# Patient Record
Sex: MALE | Race: WHITE | NOT HISPANIC OR LATINO | Employment: FULL TIME | URBAN - METROPOLITAN AREA
[De-identification: names, ages, dates, MRNs, and addresses within clinical notes are randomized per-mention and may not be internally consistent; named-entity substitution may affect disease eponyms.]

---

## 2020-09-19 ENCOUNTER — OFFICE VISIT (OUTPATIENT)
Dept: URGENT CARE | Facility: CLINIC | Age: 60
End: 2020-09-19
Payer: COMMERCIAL

## 2020-09-19 VITALS
WEIGHT: 210 LBS | HEIGHT: 71 IN | HEART RATE: 76 BPM | OXYGEN SATURATION: 100 % | BODY MASS INDEX: 29.4 KG/M2 | TEMPERATURE: 97.9 F | RESPIRATION RATE: 16 BRPM

## 2020-09-19 DIAGNOSIS — R09.81 NASAL CONGESTION: ICD-10-CM

## 2020-09-19 DIAGNOSIS — J06.9 VIRAL URI WITH COUGH: ICD-10-CM

## 2020-09-19 DIAGNOSIS — R05.9 COUGH: Primary | ICD-10-CM

## 2020-09-19 PROCEDURE — 99213 OFFICE O/P EST LOW 20 MIN: CPT | Performed by: NURSE PRACTITIONER

## 2020-09-19 PROCEDURE — U0003 INFECTIOUS AGENT DETECTION BY NUCLEIC ACID (DNA OR RNA); SEVERE ACUTE RESPIRATORY SYNDROME CORONAVIRUS 2 (SARS-COV-2) (CORONAVIRUS DISEASE [COVID-19]), AMPLIFIED PROBE TECHNIQUE, MAKING USE OF HIGH THROUGHPUT TECHNOLOGIES AS DESCRIBED BY CMS-2020-01-R: HCPCS | Performed by: NURSE PRACTITIONER

## 2020-09-19 NOTE — PROGRESS NOTES
NAME: Nereyda Chase is a 64 y o  male  : 1960    MRN: [id number]    Pulse 76   Temp 97 9 °F (36 6 °C)   Resp 16   Ht 5' 11" (1 803 m)   Wt 95 3 kg (210 lb)   SpO2 100%   BMI 29 29 kg/m²     Assessment and Plan   Cough [R05]  1  Cough  Novel Coronavirus (COVID-19), PCR [facility] - Office Collection   2  Viral URI with cough     3  Nasal congestion         Martina Allen was seen today for covid-19  Diagnoses and all orders for this visit:    Cough  -     Novel Coronavirus (COVID-19), PCR [facility] - Office Collection    Viral URI with cough    Nasal congestion        Patient Instructions   Patient Instructions   Take zyrtec, allegra, or Claritin daily  Use flonase 1-2 sprays in each nare daily   Use nasal saline to the nose,   Use humidifer in room  Symptoms worsen go to ER  Rest      Follow up with pcp   Down load the [employer] chart hema to see results  Covid testing done today and this can take about one week or more to result  If symptoms worsen go to the ER        Proceed to ER if symptoms worsen  Chief Complaint     Chief Complaint   Patient presents with    COVID-19     cough, nasal congestion and h/a         History of Present Illness     63 yo male here today with slight cough nasal congestion and headache  Patient does have history of seasonal allergies not taking anything at this time and has no fever  Patient is just concerned due to his daughter getting  in the next week and wants reassured that his symptoms are more seasonal than of COVID-19  Review of Systems   Review of Systems   Constitutional: Negative  HENT: Positive for congestion and sneezing  Negative for sinus pressure, sinus pain and sore throat  Eyes: Negative  Respiratory: Positive for cough  Cardiovascular: Negative  Gastrointestinal: Negative  Genitourinary: Negative  Musculoskeletal: Negative  Skin: Negative  Current Medications     No current outpatient medications on file      Current Allergies     Allergies as of 09/19/2020    (No Known Allergies)              Past Medical History:   Diagnosis Date    Known health problems: none        Past Surgical History:   Procedure Laterality Date    KNEE CARTILAGE SURGERY      SHOULDER ARTHROSCOPY         Family History   Problem Relation Age of Onset    COPD Father          Medications have been verified  The following portions of the patient's history were reviewed and updated as appropriate: allergies, current medications, past family history, past medical history, past social history, past surgical history and problem list     Objective   Pulse 76   Temp 97 9 °F (36 6 °C)   Resp 16   Ht 5' 11" (1 803 m)   Wt 95 3 kg (210 lb)   SpO2 100%   BMI 29 29 kg/m²      Physical Exam     Physical Exam  Constitutional:       Appearance: Normal appearance  HENT:      Head: Normocephalic  Right Ear: Hearing normal       Left Ear: Hearing normal       Nose:      Right Turbinates: Enlarged and swollen  Left Turbinates: Enlarged and swollen  Right Sinus: No maxillary sinus tenderness or frontal sinus tenderness  Left Sinus: No maxillary sinus tenderness or frontal sinus tenderness  Mouth/Throat:      Pharynx: Oropharynx is clear  Tonsils: No tonsillar exudate or tonsillar abscesses  0 on the right  0 on the left  Cardiovascular:      Rate and Rhythm: Normal rate and regular rhythm  Heart sounds: Normal heart sounds  Pulmonary:      Effort: Pulmonary effort is normal       Breath sounds: Normal breath sounds and air entry  Neurological:      Mental Status: He is alert and oriented to person, place, and time           EVANGELISTA Olivera

## 2020-09-19 NOTE — LETTER
September 19, 2020     Patient: Blake Shields   YOB: 1960   Date of Visit: 9/19/2020       To Whom It May Concern: It is my medical opinion that Blake Shields should remain out of work until labs are negative          Sincerely,        EVANGELISTA Montemayor    CC: No Recipients

## 2020-09-19 NOTE — PATIENT INSTRUCTIONS
Take zyrtec, allegra, or Claritin daily  Use flonase 1-2 sprays in each nare daily   Use nasal saline to the nose,   Use humidifer in room  Symptoms worsen go to ER  Rest      Follow up with pcp   Down load the my chart hema to see results  Covid testing done today and this can take about one week or more to result  If symptoms worsen go to the ER

## 2020-09-21 LAB — SARS-COV-2 RNA SPEC QL NAA+PROBE: NOT DETECTED

## 2021-01-04 ENCOUNTER — APPOINTMENT (EMERGENCY)
Dept: ULTRASOUND IMAGING | Facility: HOSPITAL | Age: 61
End: 2021-01-04
Payer: COMMERCIAL

## 2021-01-04 ENCOUNTER — APPOINTMENT (EMERGENCY)
Dept: CT IMAGING | Facility: HOSPITAL | Age: 61
End: 2021-01-04
Payer: COMMERCIAL

## 2021-01-04 ENCOUNTER — HOSPITAL ENCOUNTER (EMERGENCY)
Facility: HOSPITAL | Age: 61
Discharge: HOME | End: 2021-01-04
Attending: EMERGENCY MEDICINE | Admitting: EMERGENCY MEDICINE
Payer: COMMERCIAL

## 2021-01-04 VITALS
SYSTOLIC BLOOD PRESSURE: 120 MMHG | OXYGEN SATURATION: 96 % | TEMPERATURE: 96.6 F | HEART RATE: 69 BPM | BODY MASS INDEX: 32.01 KG/M2 | RESPIRATION RATE: 19 BRPM | WEIGHT: 228.62 LBS | DIASTOLIC BLOOD PRESSURE: 74 MMHG | HEIGHT: 71 IN

## 2021-01-04 DIAGNOSIS — R10.9 ABDOMINAL PAIN, UNSPECIFIED ABDOMINAL LOCATION: Primary | ICD-10-CM

## 2021-01-04 DIAGNOSIS — K80.20 CALCULUS OF GALLBLADDER WITHOUT CHOLECYSTITIS WITHOUT OBSTRUCTION: ICD-10-CM

## 2021-01-04 LAB
ALBUMIN SERPL BCP-MCNC: 4.3 G/DL (ref 3.5–5)
ALP SERPL-CCNC: 97 U/L (ref 46–116)
ALT SERPL W P-5'-P-CCNC: 61 U/L (ref 12–78)
ANION GAP SERPL CALCULATED.3IONS-SCNC: 11 MMOL/L (ref 4–13)
APTT PPP: 34 SECONDS (ref 23–37)
AST SERPL W P-5'-P-CCNC: 24 U/L (ref 5–45)
BASOPHILS # BLD AUTO: 0.08 THOUSANDS/ΜL (ref 0–0.1)
BASOPHILS NFR BLD AUTO: 1 % (ref 0–1)
BILIRUB SERPL-MCNC: 1.55 MG/DL (ref 0.2–1)
BUN SERPL-MCNC: 12 MG/DL (ref 5–25)
CALCIUM SERPL-MCNC: 9.7 MG/DL (ref 8.3–10.1)
CHLORIDE SERPL-SCNC: 97 MMOL/L (ref 100–108)
CO2 SERPL-SCNC: 29 MMOL/L (ref 21–32)
CREAT SERPL-MCNC: 1.12 MG/DL (ref 0.6–1.3)
EOSINOPHIL # BLD AUTO: 0.01 THOUSAND/ΜL (ref 0–0.61)
EOSINOPHIL NFR BLD AUTO: 0 % (ref 0–6)
ERYTHROCYTE [DISTWIDTH] IN BLOOD BY AUTOMATED COUNT: 11.9 % (ref 11.6–15.1)
GFR SERPL CREATININE-BSD FRML MDRD: 72 ML/MIN/1.73SQ M
GLUCOSE SERPL-MCNC: 173 MG/DL (ref 65–140)
HCT VFR BLD AUTO: 50.2 % (ref 36.5–49.3)
HGB BLD-MCNC: 17.3 G/DL (ref 12–17)
IMM GRANULOCYTES # BLD AUTO: 0.08 THOUSAND/UL (ref 0–0.2)
IMM GRANULOCYTES NFR BLD AUTO: 1 % (ref 0–2)
INR PPP: 0.91 (ref 0.84–1.19)
LACTATE SERPL-SCNC: 2.8 MMOL/L (ref 0.5–2)
LACTATE SERPL-SCNC: 2.9 MMOL/L (ref 0.5–2)
LIPASE SERPL-CCNC: 81 U/L (ref 73–393)
LYMPHOCYTES # BLD AUTO: 1.17 THOUSANDS/ΜL (ref 0.6–4.47)
LYMPHOCYTES NFR BLD AUTO: 8 % (ref 14–44)
MCH RBC QN AUTO: 29.2 PG (ref 26.8–34.3)
MCHC RBC AUTO-ENTMCNC: 34.5 G/DL (ref 31.4–37.4)
MCV RBC AUTO: 85 FL (ref 82–98)
MONOCYTES # BLD AUTO: 0.8 THOUSAND/ΜL (ref 0.17–1.22)
MONOCYTES NFR BLD AUTO: 5 % (ref 4–12)
NEUTROPHILS # BLD AUTO: 13.43 THOUSANDS/ΜL (ref 1.85–7.62)
NEUTS SEG NFR BLD AUTO: 85 % (ref 43–75)
NRBC BLD AUTO-RTO: 0 /100 WBCS
PLATELET # BLD AUTO: 329 THOUSANDS/UL (ref 149–390)
PMV BLD AUTO: 9.7 FL (ref 8.9–12.7)
POTASSIUM SERPL-SCNC: 3.8 MMOL/L (ref 3.5–5.3)
PROT SERPL-MCNC: 8 G/DL (ref 6.4–8.2)
PROTHROMBIN TIME: 12.1 SECONDS (ref 11.6–14.5)
RBC # BLD AUTO: 5.92 MILLION/UL (ref 3.88–5.62)
SODIUM SERPL-SCNC: 137 MMOL/L (ref 136–145)
WBC # BLD AUTO: 15.57 THOUSAND/UL (ref 4.31–10.16)

## 2021-01-04 PROCEDURE — 74177 CT ABD & PELVIS W/CONTRAST: CPT

## 2021-01-04 PROCEDURE — 76705 ECHO EXAM OF ABDOMEN: CPT

## 2021-01-04 PROCEDURE — 85025 COMPLETE CBC W/AUTO DIFF WBC: CPT | Performed by: EMERGENCY MEDICINE

## 2021-01-04 PROCEDURE — 85610 PROTHROMBIN TIME: CPT | Performed by: EMERGENCY MEDICINE

## 2021-01-04 PROCEDURE — 85730 THROMBOPLASTIN TIME PARTIAL: CPT | Performed by: EMERGENCY MEDICINE

## 2021-01-04 PROCEDURE — 96374 THER/PROPH/DIAG INJ IV PUSH: CPT

## 2021-01-04 PROCEDURE — 36415 COLL VENOUS BLD VENIPUNCTURE: CPT | Performed by: EMERGENCY MEDICINE

## 2021-01-04 PROCEDURE — 83605 ASSAY OF LACTIC ACID: CPT | Performed by: EMERGENCY MEDICINE

## 2021-01-04 PROCEDURE — 96361 HYDRATE IV INFUSION ADD-ON: CPT

## 2021-01-04 PROCEDURE — 80053 COMPREHEN METABOLIC PANEL: CPT | Performed by: EMERGENCY MEDICINE

## 2021-01-04 PROCEDURE — 99284 EMERGENCY DEPT VISIT MOD MDM: CPT

## 2021-01-04 PROCEDURE — 99285 EMERGENCY DEPT VISIT HI MDM: CPT | Performed by: EMERGENCY MEDICINE

## 2021-01-04 PROCEDURE — 96375 TX/PRO/DX INJ NEW DRUG ADDON: CPT

## 2021-01-04 PROCEDURE — 83690 ASSAY OF LIPASE: CPT | Performed by: EMERGENCY MEDICINE

## 2021-01-04 RX ORDER — ONDANSETRON 2 MG/ML
4 INJECTION INTRAMUSCULAR; INTRAVENOUS ONCE
Status: COMPLETED | OUTPATIENT
Start: 2021-01-04 | End: 2021-01-04

## 2021-01-04 RX ORDER — POLYETHYLENE GLYCOL 3350 17 G/17G
17 POWDER, FOR SOLUTION ORAL DAILY
Qty: 527 G | Refills: 0 | Status: SHIPPED | OUTPATIENT
Start: 2021-01-04 | End: 2021-02-04

## 2021-01-04 RX ORDER — KETOROLAC TROMETHAMINE 30 MG/ML
30 INJECTION, SOLUTION INTRAMUSCULAR; INTRAVENOUS ONCE
Status: COMPLETED | OUTPATIENT
Start: 2021-01-04 | End: 2021-01-04

## 2021-01-04 RX ORDER — MORPHINE SULFATE 4 MG/ML
4 INJECTION, SOLUTION INTRAMUSCULAR; INTRAVENOUS ONCE
Status: COMPLETED | OUTPATIENT
Start: 2021-01-04 | End: 2021-01-04

## 2021-01-04 RX ADMIN — SODIUM CHLORIDE 1000 ML: 0.9 INJECTION, SOLUTION INTRAVENOUS at 06:37

## 2021-01-04 RX ADMIN — KETOROLAC TROMETHAMINE 30 MG: 30 INJECTION, SOLUTION INTRAMUSCULAR at 06:36

## 2021-01-04 RX ADMIN — ONDANSETRON 4 MG: 2 INJECTION INTRAMUSCULAR; INTRAVENOUS at 06:43

## 2021-01-04 RX ADMIN — IOHEXOL 100 ML: 350 INJECTION, SOLUTION INTRAVENOUS at 07:35

## 2021-01-04 RX ADMIN — MORPHINE SULFATE 4 MG: 4 INJECTION INTRAVENOUS at 06:36

## 2021-01-04 NOTE — ED CARE HANDOFF
Emergency Department Sign Out Note        Sign out and transfer of care from Dr Heather Andersen  See Separate Emergency Department note  The patient, Josué Alvarez, was evaluated by the previous provider for abdominal pain  Workup Completed:  Some labs    ED Course / Workup Pending (followup): Remainder of labs  CT AP    CT scan negative for kidney stones, diverticulitis, colitis  However does reveal cholelithiasis  Patient has elevated white count and elevated total bilirubin  Afebrile  On re-evaluation, he does have some right upper quadrant tenderness  Case discussed with Surgical Service, do recommend right upper quadrant ultrasound  This is currently pending        Results for orders placed or performed during the hospital encounter of 01/04/21   CBC and differential   Result Value Ref Range    WBC 15 57 (H) 4 31 - 10 16 Thousand/uL    RBC 5 92 (H) 3 88 - 5 62 Million/uL    Hemoglobin 17 3 (H) 12 0 - 17 0 g/dL    Hematocrit 50 2 (H) 36 5 - 49 3 %    MCV 85 82 - 98 fL    MCH 29 2 26 8 - 34 3 pg    MCHC 34 5 31 4 - 37 4 g/dL    RDW 11 9 11 6 - 15 1 %    MPV 9 7 8 9 - 12 7 fL    Platelets 479 510 - 876 Thousands/uL    nRBC 0 /100 WBCs    Neutrophils Relative 85 (H) 43 - 75 %    Immat GRANS % 1 0 - 2 %    Lymphocytes Relative 8 (L) 14 - 44 %    Monocytes Relative 5 4 - 12 %    Eosinophils Relative 0 0 - 6 %    Basophils Relative 1 0 - 1 %    Neutrophils Absolute 13 43 (H) 1 85 - 7 62 Thousands/µL    Immature Grans Absolute 0 08 0 00 - 0 20 Thousand/uL    Lymphocytes Absolute 1 17 0 60 - 4 47 Thousands/µL    Monocytes Absolute 0 80 0 17 - 1 22 Thousand/µL    Eosinophils Absolute 0 01 0 00 - 0 61 Thousand/µL    Basophils Absolute 0 08 0 00 - 0 10 Thousands/µL   Protime-INR   Result Value Ref Range    Protime 12 1 11 6 - 14 5 seconds    INR 0 91 0 84 - 1 19   APTT   Result Value Ref Range    PTT 34 23 - 37 seconds   Comprehensive metabolic panel   Result Value Ref Range    Sodium 137 136 - 145 mmol/L    Potassium 3 8 3 5 - 5 3 mmol/L    Chloride 97 (L) 100 - 108 mmol/L    CO2 29 21 - 32 mmol/L    ANION GAP 11 4 - 13 mmol/L    BUN 12 5 - 25 mg/dL    Creatinine 1 12 0 60 - 1 30 mg/dL    Glucose 173 (H) 65 - 140 mg/dL    Calcium 9 7 8 3 - 10 1 mg/dL    AST 24 5 - 45 U/L    ALT 61 12 - 78 U/L    Alkaline Phosphatase 97 46 - 116 U/L    Total Protein 8 0 6 4 - 8 2 g/dL    Albumin 4 3 3 5 - 5 0 g/dL    Total Bilirubin 1 55 (H) 0 20 - 1 00 mg/dL    eGFR 72 ml/min/1 73sq m   Lactic acid   Result Value Ref Range    LACTIC ACID 2 9 (HH) 0 5 - 2 0 mmol/L   Lipase   Result Value Ref Range    Lipase 81 73 - 393 u/L   Lactic acid 2 Hours   Result Value Ref Range    LACTIC ACID 2 8 (HH) 0 5 - 2 0 mmol/L       US gallbladder   Final Result      Gallstone in the gallbladder neck and sludge, however no sonographic evidence of acute cholecystitis  Normal caliber common bile duct  Hepatomegaly with fatty infiltration  Workstation performed: [id number]         CT abdomen pelvis with contrast   Final Result      No acute intra-abdominal abnormality  Cholelithiasis  Workstation performed: [id number]                                             ED Course as of Jan 04 0944   Mon Jan 04, 2021   8898 Discussed with surgeon, Dr Abran Laureano, agrees pt stable for d/c and outpt f/u    Pt agreeable with this plan          Procedures  MDM    Disposition  Final diagnoses:   Abdominal pain, unspecified abdominal location   Calculus of gallbladder without cholecystitis without obstruction     Time reflects when diagnosis was documented in both MDM as applicable and the Disposition within this note     Time User Action Codes Description Comment    1/4/2021  9:41 AM Anya Austin Add [R10 9] Abdominal pain, unspecified abdominal location     1/4/2021  9:41 AM Pedro Austin Add [K80 20] Calculus of gallbladder without cholecystitis without obstruction       ED Disposition     ED Disposition Condition Date/Time Comment    Discharge Stable Mon Jan 4, 2021  9:41 AM Nandini Pelayo discharge to home/self care  Follow-up Information     Follow up With Specialties Details Why Contact Info    Lei Roth, DO General Surgery  As needed [address] [place] [address]          Patient's Medications   Discharge Prescriptions    POLYETHYLENE GLYCOL (GLYCOLAX) 17 GM/SCOOP POWDER    Take 17 g by mouth daily       Start Date: 1/4/2021  End Date: 2/4/2021       Order Dose: 17 g       Quantity: 527 g    Refills: 0     No discharge procedures on file         ED Provider  Electronically Signed by     Fatemeh White MD  01/04/21 0389

## 2021-01-04 NOTE — ED NOTES
Patient had one episode of clear yellow emesis after administration of pain medications        Petra Dyer, [facility]  01/04/21 2730

## 2021-01-04 NOTE — ED PROVIDER NOTES
History  Chief Complaint   Patient presents with    Abdominal Pain     Patient started with abdominal pain last night on the left side but now has pain all over  Patient denies NVD at this time  Patient is a 82-year-old male no prior abdominal surgery history of colonoscopy with polypectomy otherwise healthy no past medical history presents the emergency department complaining of left-sided abdominal pain started last night now radiating throughout the generalized abdomen no diarrhea the patient is not sure if you might be constipated  patient initially denied any vomiting but following medication in the ED he developed nausea and vomiting  No fevers or chills  History provided by:  Patient  Abdominal Pain  Pain location:  LLQ and LUQ  Pain quality: aching and cramping    Pain radiation: Generalized abdomen  Pain severity:  Moderate  Onset quality:  Gradual  Duration:  1 day  Timing:  Constant  Progression:  Worsening  Chronicity:  New  Associated symptoms: constipation, nausea and vomiting    Associated symptoms: no chest pain, no chills, no cough, no diarrhea, no dysuria, no fatigue, no fever, no hematuria, no shortness of breath and no sore throat        None       Past Medical History:   Diagnosis Date    Known health problems: none        Past Surgical History:   Procedure Laterality Date    KNEE CARTILAGE SURGERY      KNEE SURGERY      SHOULDER ARTHROSCOPY      SHOULDER SURGERY         Family History   Problem Relation Age of Onset    COPD Father      I have reviewed and agree with the history as documented  E-Cigarette/Vaping    E-Cigarette Use Never User      E-Cigarette/Vaping Substances     Social History     Tobacco Use    Smoking status: Never Smoker    Smokeless tobacco: Never Used   Substance Use Topics    Alcohol use: Yes    Drug use: Not Currently       Review of Systems   Constitutional: Negative for activity change, appetite change, chills, fatigue and fever     HENT: Negative for congestion, ear pain, rhinorrhea and sore throat  Eyes: Negative for discharge, redness and visual disturbance  Respiratory: Negative for cough, chest tightness, shortness of breath and wheezing  Cardiovascular: Negative for chest pain and palpitations  Gastrointestinal: Positive for abdominal pain, constipation, nausea and vomiting  Negative for diarrhea  Endocrine: Negative for polydipsia and polyuria  Genitourinary: Negative for difficulty urinating, dysuria, frequency, hematuria and urgency  Musculoskeletal: Negative for arthralgias and myalgias  Skin: Negative for color change, pallor and rash  Neurological: Negative for dizziness, weakness, light-headedness, numbness and headaches  Hematological: Negative for adenopathy  Does not bruise/bleed easily  All other systems reviewed and are negative  Physical Exam  Physical Exam  Vitals signs and nursing note reviewed  Constitutional:       Appearance: He is well-developed  HENT:      Head: Normocephalic and atraumatic  Right Ear: External ear normal       Left Ear: External ear normal       Nose: Nose normal    Eyes:      Conjunctiva/sclera: Conjunctivae normal       Pupils: Pupils are equal, round, and reactive to light  Neck:      Musculoskeletal: Normal range of motion and neck supple  Cardiovascular:      Rate and Rhythm: Normal rate and regular rhythm  Heart sounds: Normal heart sounds  Pulmonary:      Effort: Pulmonary effort is normal  No respiratory distress  Breath sounds: Normal breath sounds  No wheezing or rales  Chest:      Chest wall: No tenderness  Abdominal:      General: Bowel sounds are normal  There is no distension  Palpations: Abdomen is soft  Tenderness: There is generalized abdominal tenderness and tenderness in the left upper quadrant and left lower quadrant  There is guarding  Musculoskeletal: Normal range of motion  Skin:     General: Skin is warm and dry  Neurological:      Mental Status: He is alert and oriented to person, place, and time  Cranial Nerves: No cranial nerve deficit  Sensory: No sensory deficit  Vital Signs  ED Triage Vitals [01/04/21 0624]   Temperature Pulse Respirations Blood Pressure SpO2   (!) 96 6 °F (35 9 °C) 67 18 137/83 99 %      Temp Source Heart Rate Source Patient Position - Orthostatic VS BP Location FiO2 (%)   Temporal Monitor Lying Right arm --      Pain Score       8           Vitals:    01/04/21 0624   BP: 137/83   Pulse: 67   Patient Position - Orthostatic VS: Lying         Visual Acuity      ED Medications  Medications   sodium chloride 0 9 % bolus 1,000 mL (1,000 mL Intravenous New Bag 1/4/21 0637)   ketorolac (TORADOL) injection 30 mg (30 mg Intravenous Given 1/4/21 0636)   morphine (PF) 4 mg/mL injection 4 mg (4 mg Intravenous Given 1/4/21 0636)   ondansetron (ZOFRAN) injection 4 mg (4 mg Intravenous Given 1/4/21 0643)       Diagnostic Studies  Results Reviewed     Procedure Component Value Units Date/Time    CBC and differential [[id number]]  (Abnormal) Collected: 01/04/21 0635    Lab Status: Final result Specimen: Blood from Arm, Left Updated: 01/04/21 0645     WBC 15 57 Thousand/uL      RBC 5 92 Million/uL      Hemoglobin 17 3 g/dL      Hematocrit 50 2 %      MCV 85 fL      MCH 29 2 pg      MCHC 34 5 g/dL      RDW 11 9 %      MPV 9 7 fL      Platelets 055 Thousands/uL      nRBC 0 /100 WBCs      Neutrophils Relative 85 %      Immat GRANS % 1 %      Lymphocytes Relative 8 %      Monocytes Relative 5 %      Eosinophils Relative 0 %      Basophils Relative 1 %      Neutrophils Absolute 13 43 Thousands/µL      Immature Grans Absolute 0 08 Thousand/uL      Lymphocytes Absolute 1 17 Thousands/µL      Monocytes Absolute 0 80 Thousand/µL      Eosinophils Absolute 0 01 Thousand/µL      Basophils Absolute 0 08 Thousands/µL     Comprehensive metabolic panel [[id number]] Collected: 01/04/21 0635    Lab Status:  In process Specimen: Blood from Arm, Left Updated: 01/04/21 0643    Lipase [[id number]] Collected: 01/04/21 4741    Lab Status: In process Specimen: Blood from Arm, Left Updated: 01/04/21 [facility] [[id number]] Collected: 01/04/21 0635    Lab Status: In process Specimen: Blood from Arm, Left Updated: 01/04/21 0643    APTT [[id number]] Collected: 01/04/21 0635    Lab Status: In process Specimen: Blood from Arm, Left Updated: 01/04/21 0643    Lactic acid [[id number]] Collected: 01/04/21 0635    Lab Status: In process Specimen: Blood from Arm, Left Updated: 01/04/21 0643    UA w Reflex to Microscopic w Reflex to Culture [[id number]]     Lab Status: No result Specimen: Urine                  CT abdomen pelvis with contrast    (Results Pending)              Procedures  ECG 12 Lead Documentation Only    Date/Time: 1/4/2021 6:50 AM  Performed by: Kin Simon DO  Authorized by: Kin Simon DO     ECG reviewed by me, the ED Provider: yes    Patient location:  ED  Quality:     Tracing quality:  Limited by artifact  Rate:     ECG rate:  64    ECG rate assessment: normal    Rhythm:     Rhythm: sinus rhythm    QRS:     QRS axis:  Normal    QRS intervals:  Normal  Conduction:     Conduction: normal    ST segments:     ST segments:  Normal  T waves:     T waves: non-specific, flattening and inverted               ED Course  ED Course as of Jan 04 0651   Mon Jan 04, 2021   0650 Case discussed and care transferred to Dr Joon Guillen pending labs and imaging and further evaluation and treatment and disposition  SBIRT 20yo+      Most Recent Value   SBIRT (22 yo +)   In order to provide better care to our patients, we are screening all of our patients for alcohol and drug use  Would it be okay to ask you these screening questions?   No Filed at: 01/04/2021 7150                    MDM    Disposition  Final diagnoses:   None     ED Disposition     None      Follow-up Information    None         Patient's Medications    No medications on file     No discharge procedures on file      PDMP Review     None          ED Provider  Electronically Signed by                  Chikis Sherman DO  01/04/21 2304

## 2024-04-22 ENCOUNTER — HOSPITAL ENCOUNTER (INPATIENT)
Facility: HOSPITAL | Age: 64
LOS: 1 days | Discharge: HOME | DRG: 409 | End: 2024-04-24
Attending: EMERGENCY MEDICINE | Admitting: SURGERY
Payer: COMMERCIAL

## 2024-04-22 ENCOUNTER — APPOINTMENT (EMERGENCY)
Dept: CT IMAGING | Facility: HOSPITAL | Age: 64
DRG: 409 | End: 2024-04-22
Payer: COMMERCIAL

## 2024-04-22 DIAGNOSIS — K81.0 ACUTE CHOLECYSTITIS: ICD-10-CM

## 2024-04-22 DIAGNOSIS — K81.9 CHOLECYSTITIS: Primary | ICD-10-CM

## 2024-04-22 DIAGNOSIS — K42.9 UMBILICAL HERNIA WITHOUT OBSTRUCTION AND WITHOUT GANGRENE: ICD-10-CM

## 2024-04-22 LAB
ALBUMIN SERPL BCP-MCNC: 4.8 G/DL (ref 3.5–5)
ALP SERPL-CCNC: 80 U/L (ref 34–104)
ALT SERPL W P-5'-P-CCNC: 22 U/L (ref 7–52)
ANION GAP SERPL CALCULATED.3IONS-SCNC: 10 MMOL/L (ref 4–13)
AST SERPL W P-5'-P-CCNC: 15 U/L (ref 13–39)
BASOPHILS # BLD AUTO: 0.09 THOUSANDS/ÂΜL (ref 0–0.1)
BASOPHILS NFR BLD AUTO: 1 % (ref 0–1)
BILIRUB DIRECT SERPL-MCNC: 0.36 MG/DL (ref 0–0.2)
BILIRUB SERPL-MCNC: 2.54 MG/DL (ref 0.2–1)
BUN SERPL-MCNC: 13 MG/DL (ref 5–25)
CALCIUM SERPL-MCNC: 10.2 MG/DL (ref 8.4–10.2)
CHLORIDE SERPL-SCNC: 97 MMOL/L (ref 96–108)
CO2 SERPL-SCNC: 28 MMOL/L (ref 21–32)
CREAT SERPL-MCNC: 0.95 MG/DL (ref 0.6–1.3)
EOSINOPHIL # BLD AUTO: 0.07 THOUSAND/ÂΜL (ref 0–0.61)
EOSINOPHIL NFR BLD AUTO: 0 % (ref 0–6)
ERYTHROCYTE [DISTWIDTH] IN BLOOD BY AUTOMATED COUNT: 12.2 % (ref 11.6–15.1)
GFR SERPL CREATININE-BSD FRML MDRD: 86 ML/MIN/1.73SQ M
GLUCOSE SERPL-MCNC: 148 MG/DL (ref 65–140)
HCT VFR BLD AUTO: 47.2 % (ref 36.5–49.3)
HGB BLD-MCNC: 16.5 G/DL (ref 12–17)
IMM GRANULOCYTES # BLD AUTO: 0.1 THOUSAND/UL (ref 0–0.2)
IMM GRANULOCYTES NFR BLD AUTO: 1 % (ref 0–2)
LIPASE SERPL-CCNC: 12 U/L (ref 11–82)
LYMPHOCYTES # BLD AUTO: 1.49 THOUSANDS/ÂΜL (ref 0.6–4.47)
LYMPHOCYTES NFR BLD AUTO: 9 % (ref 14–44)
MCH RBC QN AUTO: 29.8 PG (ref 26.8–34.3)
MCHC RBC AUTO-ENTMCNC: 35 G/DL (ref 31.4–37.4)
MCV RBC AUTO: 85 FL (ref 82–98)
MONOCYTES # BLD AUTO: 1.82 THOUSAND/ÂΜL (ref 0.17–1.22)
MONOCYTES NFR BLD AUTO: 10 % (ref 4–12)
NEUTROPHILS # BLD AUTO: 13.87 THOUSANDS/ÂΜL (ref 1.85–7.62)
NEUTS SEG NFR BLD AUTO: 79 % (ref 43–75)
NRBC BLD AUTO-RTO: 0 /100 WBCS
PLATELET # BLD AUTO: 292 THOUSANDS/UL (ref 149–390)
PMV BLD AUTO: 9.8 FL (ref 8.9–12.7)
POTASSIUM SERPL-SCNC: 3.9 MMOL/L (ref 3.5–5.3)
PROT SERPL-MCNC: 7.6 G/DL (ref 6.4–8.4)
RBC # BLD AUTO: 5.54 MILLION/UL (ref 3.88–5.62)
SODIUM SERPL-SCNC: 135 MMOL/L (ref 135–147)
WBC # BLD AUTO: 17.44 THOUSAND/UL (ref 4.31–10.16)

## 2024-04-22 PROCEDURE — 99285 EMERGENCY DEPT VISIT HI MDM: CPT | Performed by: EMERGENCY MEDICINE

## 2024-04-22 PROCEDURE — 74177 CT ABD & PELVIS W/CONTRAST: CPT

## 2024-04-22 PROCEDURE — 99284 EMERGENCY DEPT VISIT MOD MDM: CPT

## 2024-04-22 PROCEDURE — 80053 COMPREHEN METABOLIC PANEL: CPT | Performed by: EMERGENCY MEDICINE

## 2024-04-22 PROCEDURE — 85025 COMPLETE CBC W/AUTO DIFF WBC: CPT | Performed by: EMERGENCY MEDICINE

## 2024-04-22 PROCEDURE — 82248 BILIRUBIN DIRECT: CPT | Performed by: EMERGENCY MEDICINE

## 2024-04-22 PROCEDURE — 96361 HYDRATE IV INFUSION ADD-ON: CPT

## 2024-04-22 PROCEDURE — 36415 COLL VENOUS BLD VENIPUNCTURE: CPT | Performed by: EMERGENCY MEDICINE

## 2024-04-22 PROCEDURE — 96374 THER/PROPH/DIAG INJ IV PUSH: CPT

## 2024-04-22 PROCEDURE — 83690 ASSAY OF LIPASE: CPT | Performed by: EMERGENCY MEDICINE

## 2024-04-22 RX ORDER — KETOROLAC TROMETHAMINE 30 MG/ML
30 INJECTION, SOLUTION INTRAMUSCULAR; INTRAVENOUS ONCE
Status: COMPLETED | OUTPATIENT
Start: 2024-04-22 | End: 2024-04-22

## 2024-04-22 RX ADMIN — KETOROLAC TROMETHAMINE 30 MG: 30 INJECTION, SOLUTION INTRAMUSCULAR; INTRAVENOUS at 22:41

## 2024-04-22 RX ADMIN — SODIUM CHLORIDE 1000 ML: 0.9 INJECTION, SOLUTION INTRAVENOUS at 22:41

## 2024-04-22 RX ADMIN — IOHEXOL 100 ML: 350 INJECTION, SOLUTION INTRAVENOUS at 23:33

## 2024-04-23 ENCOUNTER — ANESTHESIA EVENT (INPATIENT)
Dept: PERIOP | Facility: HOSPITAL | Age: 64
DRG: 409 | End: 2024-04-23
Payer: COMMERCIAL

## 2024-04-23 ENCOUNTER — ANESTHESIA (INPATIENT)
Dept: PERIOP | Facility: HOSPITAL | Age: 64
DRG: 409 | End: 2024-04-23
Payer: COMMERCIAL

## 2024-04-23 PROBLEM — K81.0 ACUTE CHOLECYSTITIS: Status: ACTIVE | Noted: 2024-04-23

## 2024-04-23 LAB
BASOPHILS # BLD AUTO: 0.06 THOUSANDS/ÂΜL (ref 0–0.1)
BASOPHILS NFR BLD AUTO: 0 % (ref 0–1)
EOSINOPHIL # BLD AUTO: 0.03 THOUSAND/ÂΜL (ref 0–0.61)
EOSINOPHIL NFR BLD AUTO: 0 % (ref 0–6)
ERYTHROCYTE [DISTWIDTH] IN BLOOD BY AUTOMATED COUNT: 12.1 % (ref 11.6–15.1)
HCT VFR BLD AUTO: 42.7 % (ref 36.5–49.3)
HGB BLD-MCNC: 14.8 G/DL (ref 12–17)
IMM GRANULOCYTES # BLD AUTO: 0.08 THOUSAND/UL (ref 0–0.2)
IMM GRANULOCYTES NFR BLD AUTO: 1 % (ref 0–2)
LYMPHOCYTES # BLD AUTO: 1.06 THOUSANDS/ÂΜL (ref 0.6–4.47)
LYMPHOCYTES NFR BLD AUTO: 7 % (ref 14–44)
MCH RBC QN AUTO: 29.7 PG (ref 26.8–34.3)
MCHC RBC AUTO-ENTMCNC: 34.7 G/DL (ref 31.4–37.4)
MCV RBC AUTO: 86 FL (ref 82–98)
MONOCYTES # BLD AUTO: 1.99 THOUSAND/ÂΜL (ref 0.17–1.22)
MONOCYTES NFR BLD AUTO: 13 % (ref 4–12)
NEUTROPHILS # BLD AUTO: 12.03 THOUSANDS/ÂΜL (ref 1.85–7.62)
NEUTS SEG NFR BLD AUTO: 79 % (ref 43–75)
NRBC BLD AUTO-RTO: 0 /100 WBCS
PLATELET # BLD AUTO: 236 THOUSANDS/UL (ref 149–390)
PLATELET # BLD AUTO: 246 THOUSANDS/UL (ref 149–390)
PMV BLD AUTO: 9.5 FL (ref 8.9–12.7)
PMV BLD AUTO: 9.6 FL (ref 8.9–12.7)
RBC # BLD AUTO: 4.99 MILLION/UL (ref 3.88–5.62)
WBC # BLD AUTO: 15.25 THOUSAND/UL (ref 4.31–10.16)

## 2024-04-23 PROCEDURE — 85049 AUTOMATED PLATELET COUNT: CPT | Performed by: SURGERY

## 2024-04-23 PROCEDURE — 0W9G4ZZ DRAINAGE OF PERITONEAL CAVITY, PERCUTANEOUS ENDOSCOPIC APPROACH: ICD-10-PCS | Performed by: STUDENT IN AN ORGANIZED HEALTH CARE EDUCATION/TRAINING PROGRAM

## 2024-04-23 PROCEDURE — 0FC44ZZ EXTIRPATION OF MATTER FROM GALLBLADDER, PERCUTANEOUS ENDOSCOPIC APPROACH: ICD-10-PCS | Performed by: STUDENT IN AN ORGANIZED HEALTH CARE EDUCATION/TRAINING PROGRAM

## 2024-04-23 PROCEDURE — 88304 TISSUE EXAM BY PATHOLOGIST: CPT | Performed by: STUDENT IN AN ORGANIZED HEALTH CARE EDUCATION/TRAINING PROGRAM

## 2024-04-23 PROCEDURE — 85025 COMPLETE CBC W/AUTO DIFF WBC: CPT | Performed by: SURGERY

## 2024-04-23 PROCEDURE — 0FT44ZZ RESECTION OF GALLBLADDER, PERCUTANEOUS ENDOSCOPIC APPROACH: ICD-10-PCS | Performed by: STUDENT IN AN ORGANIZED HEALTH CARE EDUCATION/TRAINING PROGRAM

## 2024-04-23 PROCEDURE — 0WQF4ZZ REPAIR ABDOMINAL WALL, PERCUTANEOUS ENDOSCOPIC APPROACH: ICD-10-PCS | Performed by: STUDENT IN AN ORGANIZED HEALTH CARE EDUCATION/TRAINING PROGRAM

## 2024-04-23 PROCEDURE — 88302 TISSUE EXAM BY PATHOLOGIST: CPT | Performed by: STUDENT IN AN ORGANIZED HEALTH CARE EDUCATION/TRAINING PROGRAM

## 2024-04-23 RX ORDER — HYDROMORPHONE HCL/PF 1 MG/ML
0.5 SYRINGE (ML) INJECTION
Status: DISCONTINUED | OUTPATIENT
Start: 2024-04-23 | End: 2024-04-24 | Stop reason: HOSPADM

## 2024-04-23 RX ORDER — ONDANSETRON 2 MG/ML
4 INJECTION INTRAMUSCULAR; INTRAVENOUS ONCE AS NEEDED
Status: DISCONTINUED | OUTPATIENT
Start: 2024-04-23 | End: 2024-04-24

## 2024-04-23 RX ORDER — CEFAZOLIN SODIUM 1 G/3ML
INJECTION, POWDER, FOR SOLUTION INTRAMUSCULAR; INTRAVENOUS AS NEEDED
Status: DISCONTINUED | OUTPATIENT
Start: 2024-04-23 | End: 2024-04-23

## 2024-04-23 RX ORDER — HYDROMORPHONE HCL/PF 1 MG/ML
SYRINGE (ML) INJECTION AS NEEDED
Status: DISCONTINUED | OUTPATIENT
Start: 2024-04-23 | End: 2024-04-23

## 2024-04-23 RX ORDER — BUPIVACAINE HYDROCHLORIDE AND EPINEPHRINE 2.5; 5 MG/ML; UG/ML
INJECTION, SOLUTION EPIDURAL; INFILTRATION; INTRACAUDAL; PERINEURAL AS NEEDED
Status: DISCONTINUED | OUTPATIENT
Start: 2024-04-23 | End: 2024-04-23 | Stop reason: HOSPADM

## 2024-04-23 RX ORDER — ONDANSETRON 2 MG/ML
4 INJECTION INTRAMUSCULAR; INTRAVENOUS EVERY 6 HOURS PRN
Status: DISCONTINUED | OUTPATIENT
Start: 2024-04-23 | End: 2024-04-24 | Stop reason: HOSPADM

## 2024-04-23 RX ORDER — LIDOCAINE HYDROCHLORIDE 10 MG/ML
INJECTION, SOLUTION EPIDURAL; INFILTRATION; INTRACAUDAL; PERINEURAL AS NEEDED
Status: DISCONTINUED | OUTPATIENT
Start: 2024-04-23 | End: 2024-04-23

## 2024-04-23 RX ORDER — ONDANSETRON 2 MG/ML
INJECTION INTRAMUSCULAR; INTRAVENOUS AS NEEDED
Status: DISCONTINUED | OUTPATIENT
Start: 2024-04-23 | End: 2024-04-23

## 2024-04-23 RX ORDER — MIDAZOLAM HYDROCHLORIDE 2 MG/2ML
INJECTION, SOLUTION INTRAMUSCULAR; INTRAVENOUS AS NEEDED
Status: DISCONTINUED | OUTPATIENT
Start: 2024-04-23 | End: 2024-04-23

## 2024-04-23 RX ORDER — ACETAMINOPHEN 10 MG/ML
1000 INJECTION, SOLUTION INTRAVENOUS EVERY 6 HOURS PRN
Status: DISCONTINUED | OUTPATIENT
Start: 2024-04-23 | End: 2024-04-24

## 2024-04-23 RX ORDER — SODIUM CHLORIDE, SODIUM LACTATE, POTASSIUM CHLORIDE, CALCIUM CHLORIDE 600; 310; 30; 20 MG/100ML; MG/100ML; MG/100ML; MG/100ML
INJECTION, SOLUTION INTRAVENOUS CONTINUOUS PRN
Status: DISCONTINUED | OUTPATIENT
Start: 2024-04-23 | End: 2024-04-23

## 2024-04-23 RX ORDER — METRONIDAZOLE 500 MG/100ML
500 INJECTION, SOLUTION INTRAVENOUS EVERY 8 HOURS
Status: DISCONTINUED | OUTPATIENT
Start: 2024-04-23 | End: 2024-04-24 | Stop reason: HOSPADM

## 2024-04-23 RX ORDER — SODIUM CHLORIDE 9 MG/ML
75 INJECTION, SOLUTION INTRAVENOUS CONTINUOUS
Status: DISCONTINUED | OUTPATIENT
Start: 2024-04-23 | End: 2024-04-24 | Stop reason: HOSPADM

## 2024-04-23 RX ORDER — KETOROLAC TROMETHAMINE 30 MG/ML
INJECTION, SOLUTION INTRAMUSCULAR; INTRAVENOUS AS NEEDED
Status: DISCONTINUED | OUTPATIENT
Start: 2024-04-23 | End: 2024-04-23

## 2024-04-23 RX ORDER — ACETAMINOPHEN 325 MG/1
975 TABLET ORAL ONCE
Status: CANCELLED | OUTPATIENT
Start: 2024-04-23 | End: 2024-04-23

## 2024-04-23 RX ORDER — FENTANYL CITRATE 50 UG/ML
INJECTION, SOLUTION INTRAMUSCULAR; INTRAVENOUS AS NEEDED
Status: DISCONTINUED | OUTPATIENT
Start: 2024-04-23 | End: 2024-04-23

## 2024-04-23 RX ORDER — DEXAMETHASONE SODIUM PHOSPHATE 10 MG/ML
INJECTION, SOLUTION INTRAMUSCULAR; INTRAVENOUS AS NEEDED
Status: DISCONTINUED | OUTPATIENT
Start: 2024-04-23 | End: 2024-04-23

## 2024-04-23 RX ORDER — FENTANYL CITRATE/PF 50 MCG/ML
25 SYRINGE (ML) INJECTION
Status: DISCONTINUED | OUTPATIENT
Start: 2024-04-23 | End: 2024-04-24 | Stop reason: HOSPADM

## 2024-04-23 RX ORDER — PROMETHAZINE HYDROCHLORIDE 25 MG/ML
25 INJECTION, SOLUTION INTRAMUSCULAR; INTRAVENOUS ONCE AS NEEDED
Status: DISCONTINUED | OUTPATIENT
Start: 2024-04-23 | End: 2024-04-24 | Stop reason: HOSPADM

## 2024-04-23 RX ORDER — HEPARIN SODIUM 5000 [USP'U]/ML
5000 INJECTION, SOLUTION INTRAVENOUS; SUBCUTANEOUS EVERY 8 HOURS SCHEDULED
Status: DISCONTINUED | OUTPATIENT
Start: 2024-04-23 | End: 2024-04-24 | Stop reason: HOSPADM

## 2024-04-23 RX ORDER — CIPROFLOXACIN 2 MG/ML
400 INJECTION, SOLUTION INTRAVENOUS EVERY 12 HOURS
Status: DISCONTINUED | OUTPATIENT
Start: 2024-04-23 | End: 2024-04-24 | Stop reason: HOSPADM

## 2024-04-23 RX ORDER — PROPOFOL 10 MG/ML
INJECTION, EMULSION INTRAVENOUS AS NEEDED
Status: DISCONTINUED | OUTPATIENT
Start: 2024-04-23 | End: 2024-04-23

## 2024-04-23 RX ORDER — ROCURONIUM BROMIDE 10 MG/ML
INJECTION, SOLUTION INTRAVENOUS AS NEEDED
Status: DISCONTINUED | OUTPATIENT
Start: 2024-04-23 | End: 2024-04-23

## 2024-04-23 RX ADMIN — ROCURONIUM BROMIDE 10 MG: 10 INJECTION, SOLUTION INTRAVENOUS at 19:10

## 2024-04-23 RX ADMIN — HYDROMORPHONE HYDROCHLORIDE 0.5 MG: 1 INJECTION, SOLUTION INTRAMUSCULAR; INTRAVENOUS; SUBCUTANEOUS at 03:38

## 2024-04-23 RX ADMIN — ROCURONIUM BROMIDE 10 MG: 10 INJECTION, SOLUTION INTRAVENOUS at 19:56

## 2024-04-23 RX ADMIN — HYDROMORPHONE HYDROCHLORIDE 0.5 MG: 1 INJECTION, SOLUTION INTRAMUSCULAR; INTRAVENOUS; SUBCUTANEOUS at 18:59

## 2024-04-23 RX ADMIN — ONDANSETRON 4 MG: 2 INJECTION INTRAMUSCULAR; INTRAVENOUS at 18:17

## 2024-04-23 RX ADMIN — SODIUM CHLORIDE 75 ML/HR: 0.9 INJECTION, SOLUTION INTRAVENOUS at 12:57

## 2024-04-23 RX ADMIN — METRONIDAZOLE 500 MG: 500 INJECTION, SOLUTION INTRAVENOUS at 16:22

## 2024-04-23 RX ADMIN — DEXMEDETOMIDINE HYDROCHLORIDE 8 MCG: 100 INJECTION, SOLUTION INTRAVENOUS at 19:56

## 2024-04-23 RX ADMIN — PROPOFOL 200 MG: 10 INJECTION, EMULSION INTRAVENOUS at 18:04

## 2024-04-23 RX ADMIN — DEXMEDETOMIDINE HYDROCHLORIDE 12 MCG: 100 INJECTION, SOLUTION INTRAVENOUS at 18:37

## 2024-04-23 RX ADMIN — LIDOCAINE HYDROCHLORIDE 50 MG: 10 INJECTION, SOLUTION EPIDURAL; INFILTRATION; INTRACAUDAL; PERINEURAL at 18:04

## 2024-04-23 RX ADMIN — MIDAZOLAM 2 MG: 1 INJECTION INTRAMUSCULAR; INTRAVENOUS at 17:55

## 2024-04-23 RX ADMIN — CIPROFLOXACIN 400 MG: 400 INJECTION, SOLUTION INTRAVENOUS at 01:20

## 2024-04-23 RX ADMIN — FENTANYL CITRATE 100 MCG: 50 INJECTION INTRAMUSCULAR; INTRAVENOUS at 18:04

## 2024-04-23 RX ADMIN — CIPROFLOXACIN 400 MG: 400 INJECTION, SOLUTION INTRAVENOUS at 12:57

## 2024-04-23 RX ADMIN — SODIUM CHLORIDE 75 ML/HR: 0.9 INJECTION, SOLUTION INTRAVENOUS at 00:39

## 2024-04-23 RX ADMIN — METRONIDAZOLE 500 MG: 500 INJECTION, SOLUTION INTRAVENOUS at 07:34

## 2024-04-23 RX ADMIN — METRONIDAZOLE 500 MG: 500 INJECTION, SOLUTION INTRAVENOUS at 00:41

## 2024-04-23 RX ADMIN — SUGAMMADEX 200 MG: 100 INJECTION, SOLUTION INTRAVENOUS at 20:18

## 2024-04-23 RX ADMIN — HEPARIN SODIUM 5000 UNITS: 5000 INJECTION, SOLUTION INTRAVENOUS; SUBCUTANEOUS at 14:13

## 2024-04-23 RX ADMIN — HYDROMORPHONE HYDROCHLORIDE 0.5 MG: 1 INJECTION, SOLUTION INTRAMUSCULAR; INTRAVENOUS; SUBCUTANEOUS at 07:44

## 2024-04-23 RX ADMIN — CEFAZOLIN 2000 MG: 330 INJECTION, POWDER, FOR SOLUTION INTRAMUSCULAR; INTRAVENOUS at 18:11

## 2024-04-23 RX ADMIN — ROCURONIUM BROMIDE 10 MG: 10 INJECTION, SOLUTION INTRAVENOUS at 18:50

## 2024-04-23 RX ADMIN — SODIUM CHLORIDE, SODIUM LACTATE, POTASSIUM CHLORIDE, AND CALCIUM CHLORIDE: .6; .31; .03; .02 INJECTION, SOLUTION INTRAVENOUS at 20:20

## 2024-04-23 RX ADMIN — DEXAMETHASONE SODIUM PHOSPHATE 10 MG: 10 INJECTION, SOLUTION INTRAMUSCULAR; INTRAVENOUS at 18:10

## 2024-04-23 RX ADMIN — ROCURONIUM BROMIDE 50 MG: 10 INJECTION, SOLUTION INTRAVENOUS at 18:04

## 2024-04-23 RX ADMIN — HEPARIN SODIUM 5000 UNITS: 5000 INJECTION, SOLUTION INTRAVENOUS; SUBCUTANEOUS at 05:49

## 2024-04-23 RX ADMIN — ONDANSETRON 4 MG: 2 INJECTION INTRAMUSCULAR; INTRAVENOUS at 03:43

## 2024-04-23 RX ADMIN — KETOROLAC TROMETHAMINE 30 MG: 30 INJECTION, SOLUTION INTRAMUSCULAR; INTRAVENOUS at 19:56

## 2024-04-23 NOTE — PLAN OF CARE
Problem: PAIN - ADULT  Goal: Verbalizes/displays adequate comfort level or baseline comfort level  Description: Interventions:  - Encourage patient to monitor pain and request assistance  - Assess pain using appropriate pain scale  - Administer analgesics based on type and severity of pain and evaluate response  - Implement non-pharmacological measures as appropriate and evaluate response  - Consider cultural and social influences on pain and pain management  - Notify physician/advanced practitioner if interventions unsuccessful or patient reports new pain  Outcome: Progressing     Problem: INFECTION - ADULT  Goal: Absence or prevention of progression during hospitalization  Description: INTERVENTIONS:  - Assess and monitor for signs and symptoms of infection  - Monitor lab/diagnostic results  - Monitor all insertion sites, i.e. indwelling lines, tubes, and drains  - Monitor endotracheal if appropriate and nasal secretions for changes in amount and color  - [place] appropriate cooling/warming therapies per order  - Administer medications as ordered  - Instruct and encourage patient and family to use good hand hygiene technique  - Identify and instruct in appropriate isolation precautions for identified infection/condition  Outcome: Progressing  Goal: Absence of fever/infection during neutropenic period  Description: INTERVENTIONS:  - Monitor WBC    Outcome: Progressing     Problem: SAFETY ADULT  Goal: Patient will remain free of falls  Description: INTERVENTIONS:  - Educate patient/family on patient safety including physical limitations  - Instruct patient to call for assistance with activity   - Consult OT/PT to assist with strengthening/mobility   - Keep Call bell within reach  - Keep bed low and locked with side rails adjusted as appropriate  - Keep care items and personal belongings within reach  - Initiate and maintain comfort rounds  - Make Fall Risk Sign visible to staff  - Offer Toileting every 2 Hours,  in advance of need  - Initiate/Maintain alarm  - Obtain necessary fall risk management equipment:   - Apply yellow socks and bracelet for high fall risk patients  - Consider moving patient to room near nurses station  Outcome: Progressing  Goal: Maintain or return to baseline ADL function  Description: INTERVENTIONS:  -  Assess patient's ability to carry out ADLs; assess patient's baseline for ADL function and identify physical deficits which impact ability to perform ADLs (bathing, care of mouth/teeth, toileting, grooming, dressing, etc.)  - Assess/evaluate cause of self-care deficits   - Assess range of motion  - Assess patient's mobility; develop plan if impaired  - Assess patient's need for assistive devices and provide as appropriate  - Encourage maximum independence but intervene and supervise when necessary  - Involve family in performance of ADLs  - Assess for home care needs following discharge   - Consider OT consult to assist with ADL evaluation and planning for discharge  - Provide patient education as appropriate  Outcome: Progressing  Goal: Maintains/Returns to pre admission functional level  Description: INTERVENTIONS:  - Perform AM-PAC 6 Click Basic Mobility/ Daily Activity assessment daily.  - Set and communicate daily mobility goal to care team and patient/family/caregiver.   - Collaborate with rehabilitation services on mobility goals if consulted  - Perform Range of Motion 3 times a day.  - Reposition patient every 2 hours.  - Dangle patient 3 times a day  - Stand patient 3 times a day  - Ambulate patient 3 times a day  - Out of bed to chair 3 times a day   - Out of bed for meals 3 times a day  - Out of bed for toileting  - Record patient progress and toleration of activity level   Outcome: Progressing     Problem: DISCHARGE PLANNING  Goal: Discharge to home or other facility with appropriate resources  Description: INTERVENTIONS:  - Identify barriers to discharge w/patient and caregiver  -  Arrange for needed discharge resources and transportation as appropriate  - Identify discharge learning needs (meds, wound care, etc.)  - Arrange for interpretive services to assist at discharge as needed  - Refer to Case Management Department for coordinating discharge planning if the patient needs post-hospital services based on physician/advanced practitioner order or complex needs related to functional status, cognitive ability, or social support system  Outcome: Progressing     Problem: Knowledge Deficit  Goal: Patient/family/caregiver demonstrates understanding of disease process, treatment plan, medications, and discharge instructions  Description: Complete learning assessment and assess knowledge base.  Interventions:  - Provide teaching at level of understanding  - Provide teaching via preferred learning methods  Outcome: Progressing

## 2024-04-23 NOTE — DISCHARGE INSTR - AVS FIRST PAGE
Laparoscopic Cholecystectomy   WHAT YOU NEED TO KNOW:   Laparoscopic cholecystectomy is surgery to remove your gallbladder.     DISCHARGE INSTRUCTIONS:   Call Dr. Green's office at [phone] with any questions or concerns    Medicines:  You may need any of the following:  Prescription pain medicine - Take as directed    You may also take tylenol or ibuprofen as needed    Take your medicine as directed.  Contact your healthcare provider if you think your medicine is not helping or if you have side effects. Tell her if you are allergic to any medicine. Keep a list of the medicines, vitamins, and herbs you take. Include the amounts, and when and why you take them. Bring the list or the pill bottles to follow-up visits. Carry your medicine list with you in case of an emergency.    Follow up with your healthcare provider 2 weeks after surgery, or as directed:  Write down your questions so you remember to ask them during your visits.  Wound care:  You may shower and pat the incisions dry.  Do not soak underwater for 2 weeks   What to eat after surgery:  You may eat whatever you feel up to following surgery.   You may notice diarrhea with fatty foods. Drink plenty of liquids.   When to return to work and other activities:  No lifting, pushing, or pulling greater then 10lb for 2 weeks.  Walk as much as possible.  YOU may drive when you are comfortable enough to turn and press the brake without pain medication    Contact your healthcare provider if:   You have a fever over 101°F (38°C) or chills.     You have pain or nausea that is not relieved by medicine.     You have redness and swelling around your incisions, or blood or pus is leaking from your incisions.     You are constipated or have diarrhea.     Your skin or eyes are yellow, or your bowel movements are pale.     You have questions or concerns about your surgery, condition, or care.  Seek care immediately or call 911 if:   You cannot stop vomiting.     Your  bowel movements are black or bloody.     You have pain in your abdomen and it is swollen or hard.     Your arm or leg feels warm, tender, and painful. It may look swollen and red.    You feel lightheaded, short of breath, and have chest pain.     You cough up blood.  © 2017 [employer] Information is for End User's use only and may not be sold, redistributed or otherwise used for commercial purposes. All illustrations and images included in CareNotes® are the copyrighted property of [employer] or [employer].  The above information is an  only. It is not intended as medical advice for individual conditions or treatments. Talk to your doctor, nurse or pharmacist before following any medical regimen to see if it is safe and effective for you.

## 2024-04-23 NOTE — ANESTHESIA PREPROCEDURE EVALUATION
Procedure:  CHOLECYSTECTOMY LAPAROSCOPIC (Abdomen)  REPAIR HERNIA UMBILICAL (Abdomen)    Relevant Problems   No relevant active problems        Physical Exam    Airway    Mallampati score: II  TM Distance: >3 FB  Neck ROM: full     Dental   No notable dental hx     Cardiovascular  Cardiovascular exam normal    Pulmonary  Pulmonary exam normal     Other Findings        Anesthesia Plan  ASA Score- 1     Anesthesia Type- general with ASA Monitors.         Additional Monitors:     Airway Plan:            Plan Factors-Exercise tolerance (METS): >4 METS.    Chart reviewed. EKG reviewed. Imaging results reviewed. Existing labs reviewed. Patient summary reviewed.    Patient is not a current smoker.      Obstructive sleep apnea risk education given perioperatively.        Induction- intravenous.    Postoperative Plan-     Informed Consent- Anesthetic plan and risks discussed with patient.  I personally reviewed this patient with the CRNA. Discussed and agreed on the Anesthesia Plan with the CRNA..

## 2024-04-23 NOTE — PLAN OF CARE
Problem: INFECTION - ADULT  Goal: Absence or prevention of progression during hospitalization  Description: INTERVENTIONS:  - Assess and monitor for signs and symptoms of infection  - Monitor lab/diagnostic results  - Monitor all insertion sites, i.e. indwelling lines, tubes, and drains  - Monitor endotracheal if appropriate and nasal secretions for changes in amount and color  - [place] appropriate cooling/warming therapies per order  - Administer medications as ordered  - Instruct and encourage patient and family to use good hand hygiene technique  - Identify and instruct in appropriate isolation precautions for identified infection/condition  Outcome: Progressing

## 2024-04-23 NOTE — H&P
H&P - General Surgery   Nicholas Worley 63 y.o. male MRN: [id number]  Unit/Bed#: -01 Encounter: [id number]  Assessment:  Nicholas Worley is a 63 y.o. male with past medical history significant for Mildly elevated blood sugars, who initially presented to [facility] ED last evening with complaints of right upper quadrant pain that began on Saturday when he was in [place], worse with eating.  Workup in the ED revealed ct scan with findings concerning for acute cholecystitis with leukocytosis, therefore general surgery was consulted for consideration for admission, along with small wide necked umbilical hernia containing of small partial loop of small bowel that evidence of strangulation obstruction.    Ct abd pelvis with contrast 04/22/2024:  -ABDOMINAL WALL/INGUINAL REGIONS: There is a small wide necked umbilical hernia containing a small partial loop of small bowel without evidence of strangulation or obstruction.  -IMPRESSION:  -Cholelithiasis with mild to moderate pericholecystic inflammatory changes highly suspicious for acute cholecystitis. Correlation with laboratory studies is recommended. A right upper quadrant ultrasound could be performed for further evaluation.  -Mild colonic wall thickening at the hepatic flexure likely reactive from adjacent gallbladder inflammatory changes.  -Scattered colonic diverticulosis with no evidence of diverticulitis.    Mild leukocytosis Wbcs 15 (17)  Hgb stable at 14.8  DB mildly elevated at 0.36  Lipase WNL  Normal transaminase with the exception of acute on chronic elevation of TB 2.54 (1.4) (range of 1.4-19 for the past 3 years)    Afebrile, VSS and WNL on RA    Plan:  -admit to observation under general surgery service  -plan for OR today for laparoscopic cholecystectomy, possible open +/- umbilical hernia repair with possible mesh  -NPO  -IV fluids  -IV cipro/flagyl  -antiemetics/analgesics PRN  -possible d/c home today or tomorrow am pending outcome of surgery  -SCDs and  heparin for DVT prophylaxsis    History of Present Illness   Chief Complaint: Right upper quadrant pain    HPI:  Nicholas Worley is a 63 y.o. male with past medical history significant for Mildly elevated blood sugars, who initially presented to [facility] ED last evening with complaints of right upper quadrant pain that began on Saturday when he was in [place], worse with eating.  Pain continued to worsen, with some epigastric pain noted as well.  Patient was able to fly home on Sunday.  He reports that he has had continued worsening of his right upper quadrant pain, which prompted him to come to the emergency room for further evaluation.  Patient reports that yesterday after eating pizza in the afternoon, he noted pain was severe, and has not improved even with pain medication since admission.    Workup in the ED revealed ct scan with findings concerning for acute cholecystitis with leukocytosis, therefore general surgery was consulted for consideration for admission.     He was previously seen and evaluated by Dr Green for cholelithiasis in 2021 following a similar episode. At that time pt preferred observation given he had improved with dietary changes.    Reports that today he continues with right upper quadrant and epigastric discomfort.  No significant bloating.  Denies any fever, chills, diarrhea, nausea, vomiting.  He did notice some nausea after receiving pain medication.    Patient also reports having known umbilical hernia, which recently has been enlarging in size.  Denies any difficulty with reducing it, but is asking if this could be fixed at the same time as gallbladder removal.    Accompanied by his wife.    Denies any allergies to medications.    Denies any issues with anesthesia previously.    Denies any previous abdominal surgeries.    Review of Systems   Constitutional:  Negative for fever.   Respiratory:  Negative for chest tightness and shortness of breath.    Cardiovascular:  Negative for chest pain  "and leg swelling.   Gastrointestinal:  Positive for abdominal distention and abdominal pain. Negative for anal bleeding, blood in stool, constipation, diarrhea, nausea and vomiting.   All other systems reviewed and are negative.      Historical Information   Past Medical History:   Diagnosis Date    Known health problems: none      Past Surgical History:   Procedure Laterality Date    KNEE CARTILAGE SURGERY      KNEE SURGERY      SHOULDER ARTHROSCOPY      SHOULDER SURGERY       Social History   Social History     Substance and Sexual Activity   Alcohol Use Yes     Social History     Substance and Sexual Activity   Drug Use Not Currently     Social History     Tobacco Use   Smoking Status Never   Smokeless Tobacco Never     E-Cigarette/Vaping    E-Cigarette Use Never User      E-Cigarette/Vaping Substances     Family History: non-contributory    Meds/Allergies   all medications and allergies reviewed  No Known Allergies    Objective   First Vitals:   Blood Pressure: 145/91 (04/22/24 2219)  Pulse: 91 (04/22/24 2219)  Temperature: 97.5 °F (36.4 °C) (04/22/24 2219)  Temp Source: Temporal (04/22/24 2219)  Respirations: 18 (04/22/24 2219)  Height: 5' 11\" (180.3 cm) (04/22/24 2219)  Weight - Scale: 99.8 kg (220 lb) (04/22/24 2219)  SpO2: 95 % (04/22/24 2219)    Current Vitals:   Blood Pressure: 125/73 (04/23/24 0728)  Pulse: 83 (04/23/24 0728)  Temperature: 98.8 °F (37.1 °C) (04/23/24 0728)  Temp Source: Temporal (04/23/24 0047)  Respirations: 17 (04/23/24 0728)  Height: 5' 11\" (180.3 cm) (04/23/24 0047)  Weight - Scale: 99.8 kg (220 lb) (04/23/24 0047)  SpO2: 94 % (04/23/24 0744)    No intake or output data in the 24 hours ending 04/23/24 0829    Invasive Devices       Peripheral Intravenous Line  Duration             Peripheral IV 04/22/24 Right Antecubital <1 day                    Physical Exam  Vitals reviewed.   Constitutional:       General: He is not in acute distress.     Appearance: Normal appearance. He is not " ill-appearing, toxic-appearing or diaphoretic.   HENT:      Head: Normocephalic and atraumatic.      Mouth/Throat:      Pharynx: Oropharynx is clear. No oropharyngeal exudate.   Eyes:      General: No scleral icterus.  Cardiovascular:      Rate and Rhythm: Normal rate.      Heart sounds: Normal heart sounds.   Pulmonary:      Effort: Pulmonary effort is normal. No respiratory distress.      Breath sounds: Normal breath sounds.   Abdominal:      General: Bowel sounds are normal. There is distension.      Palpations: Abdomen is soft. There is no mass.      Tenderness: There is abdominal tenderness. There is no guarding or rebound.      Hernia: A hernia is present.      Comments: Present but hypoactive bowel sounds noted throughout.  Mild to moderate right upper quadrant tenderness, with mild epigastric tenderness.    Noted umbilical hernia with mild skin thinning.  Able to be easily reduced, feels as though it contains bowel, with mild tenderness during reduction.   Musculoskeletal:         General: Normal range of motion.   Skin:     General: Skin is warm and dry.      Capillary Refill: Capillary refill takes less than 2 seconds.      Coloration: Skin is not jaundiced.   Neurological:      General: No focal deficit present.      Mental Status: He is alert and oriented to person, place, and time.   Psychiatric:         Mood and Affect: Mood normal.         Lab Results: I have personally reviewed pertinent lab results.  , CBC:   Lab Results   Component Value Date    WBC 15.25 (H) 04/23/2024    HGB 14.8 04/23/2024    HCT 42.7 04/23/2024    MCV 86 04/23/2024     04/23/2024    RBC 4.99 04/23/2024    MCH 29.7 04/23/2024    MCHC 34.7 04/23/2024    RDW 12.1 04/23/2024    MPV 9.6 04/23/2024    NRBC 0 04/23/2024   , CMP:   Lab Results   Component Value Date    SODIUM 135 04/22/2024    K 3.9 04/22/2024    CL 97 04/22/2024    CO2 28 04/22/2024    BUN 13 04/22/2024    CREATININE 0.95 04/22/2024    CALCIUM 10.2 04/22/2024     AST 15 04/22/2024    ALT 22 04/22/2024    ALKPHOS 80 04/22/2024    EGFR 86 04/22/2024   , Lipase:   Lab Results   Component Value Date    LIPASE 12 04/22/2024     Imaging: I have personally reviewed pertinent films in PACS  EKG, Pathology, and Other Studies: I have personally reviewed pertinent reports.      Code Status: Level 1 - Full Code  Advance Directive and Living Will:      Power of :    POLST:      Counseling / Coordination of Care  Total floor / unit time spent today 30 minutes.  Greater than 50% of total time was spent with the patient and / or family counseling and / or coordination of care.  A description of the counseling / coordination of care: Plan of care, possibilities of observation versus surgery, expected postoperative recovery, risks and benefits.

## 2024-04-23 NOTE — ED PROVIDER NOTES
History  Chief Complaint   Patient presents with    Abdominal Pain     Pt reports mid/upper abdominal pain that started Saturday while he was in [place]. Denies N/V/D, denies fevers.      2-3 days of right upper quadrant and epigastric abdominal pain with sensation of abdominal bloating and cramping.  No nausea or vomiting.  No fevers although patient does complain of intermittent chills.  No diarrhea or constipation.  No other complaints      History provided by:  Patient   used: No    Abdominal Pain  Pain location:  Epigastric and RUQ  Pain quality: aching and cramping    Pain radiates to:  Does not radiate  Pain severity:  Moderate  Onset quality:  Gradual  Duration:  3 days  Timing:  Constant  Progression:  Waxing and waning  Chronicity:  New  Relieved by:  Nothing  Worsened by:  Nothing  Ineffective treatments:  None tried  Associated symptoms: chills    Associated symptoms: no chest pain, no constipation, no cough, no diarrhea, no dysuria, no fatigue, no fever, no flatus, no hematemesis, no hematochezia, no hematuria, no nausea, no shortness of breath, no sore throat and no vomiting        Prior to Admission Medications   Prescriptions Last Dose Informant Patient Reported? Taking?   polyethylene glycol (GLYCOLAX) 17 GM/SCOOP powder   No No   Sig: Take 17 g by mouth daily      Facility-Administered Medications: None       Past Medical History:   Diagnosis Date    Known health problems: none        Past Surgical History:   Procedure Laterality Date    KNEE CARTILAGE SURGERY      KNEE SURGERY      SHOULDER ARTHROSCOPY      SHOULDER SURGERY         Family History   Problem Relation Age of Onset    COPD Father      I have reviewed and agree with the history as documented.    E-Cigarette/Vaping    E-Cigarette Use Never User      E-Cigarette/Vaping Substances     Social History     Tobacco Use    Smoking status: Never    Smokeless tobacco: Never   Vaping Use    Vaping status: Never Used   Substance  Use Topics    Alcohol use: Yes    Drug use: Not Currently       Review of Systems   Constitutional:  Positive for chills. Negative for fatigue and fever.   HENT:  Negative for ear pain, hearing loss, sore throat, trouble swallowing and voice change.    Eyes:  Negative for pain and discharge.   Respiratory:  Negative for cough, shortness of breath and wheezing.    Cardiovascular:  Negative for chest pain and palpitations.   Gastrointestinal:  Positive for abdominal pain. Negative for blood in stool, constipation, diarrhea, flatus, hematemesis, hematochezia, nausea and vomiting.   Genitourinary:  Negative for dysuria, flank pain, frequency and hematuria.   Musculoskeletal:  Negative for joint swelling, neck pain and neck stiffness.   Skin:  Negative for rash and wound.   Neurological:  Negative for dizziness, seizures, syncope, facial asymmetry and headaches.   Psychiatric/Behavioral:  Negative for hallucinations, self-injury and suicidal ideas.    All other systems reviewed and are negative.      Physical Exam  Physical Exam  Vitals and nursing note reviewed.   Constitutional:       General: He is not in acute distress.     Appearance: He is well-developed.   HENT:      Head: Normocephalic and atraumatic.      Right Ear: External ear normal.      Left Ear: External ear normal.   Eyes:      General: No scleral icterus.        Right eye: No discharge.         Left eye: No discharge.      Extraocular Movements: Extraocular movements intact.      Conjunctiva/sclera: Conjunctivae normal.   Cardiovascular:      Rate and Rhythm: Normal rate and regular rhythm.      Heart sounds: Normal heart sounds. No murmur heard.  Pulmonary:      Effort: Pulmonary effort is normal.      Breath sounds: Normal breath sounds. No wheezing or rales.   Abdominal:      General: Bowel sounds are normal. There is no distension.      Palpations: Abdomen is soft.      Tenderness: There is abdominal tenderness in the right upper quadrant and  epigastric area. There is no guarding or rebound. Negative signs include Chaudhary's sign and McBurney's sign.   Musculoskeletal:         General: No deformity. Normal range of motion.      Cervical back: Normal range of motion and neck supple.   Skin:     General: Skin is warm and dry.      Findings: No rash.   Neurological:      General: No focal deficit present.      Mental Status: He is alert and oriented to person, place, and time.      Cranial Nerves: No cranial nerve deficit.   Psychiatric:         Mood and Affect: Mood normal.         Behavior: Behavior normal.         Thought Content: Thought content normal.         Judgment: Judgment normal.         Vital Signs  ED Triage Vitals [04/22/24 2219]   Temperature Pulse Respirations Blood Pressure SpO2   97.5 °F (36.4 °C) 91 18 145/91 95 %      Temp Source Heart Rate Source Patient Position - Orthostatic VS BP Location FiO2 (%)   Temporal Monitor Sitting Right arm --      Pain Score       7           Vitals:    04/22/24 2219   BP: 145/91   Pulse: 91   Patient Position - Orthostatic VS: Sitting         Visual Acuity      ED Medications  Medications   sodium chloride 0.9 % infusion (has no administration in time range)   ondansetron (ZOFRAN) injection 4 mg (has no administration in time range)   heparin (porcine) subcutaneous injection 5,000 Units (has no administration in time range)   ciprofloxacin (CIPRO) IVPB (premix in 5% dextrose) 400 mg 200 mL (has no administration in time range)   metroNIDAZOLE (FLAGYL) IVPB (premix) 500 mg 100 mL (has no administration in time range)   HYDROmorphone (DILAUDID) injection 0.5 mg (has no administration in time range)   acetaminophen (Ofirmev) injection 1,000 mg (has no administration in time range)   sodium chloride 0.9 % bolus 1,000 mL (0 mL Intravenous Stopped 4/22/24 2347)   ketorolac (TORADOL) injection 30 mg (30 mg Intravenous Given 4/22/24 2241)   iohexol (OMNIPAQUE) 350 MG/ML injection (MULTI-DOSE) 100 mL (100 mL  Intravenous Given 4/22/24 2333)       Diagnostic Studies  Results Reviewed       Procedure Component Value Units Date/Time    Platelet count [[id number]]     Lab Status: No result Specimen: Blood     Comprehensive metabolic panel [[id number]]  (Abnormal) Collected: 04/22/24 2241    Lab Status: Final result Specimen: Blood from Arm, Right Updated: 04/22/24 2303     Sodium 135 mmol/L      Potassium 3.9 mmol/L      Chloride 97 mmol/L      CO2 28 mmol/L      ANION GAP 10 mmol/L      BUN 13 mg/dL      Creatinine 0.95 mg/dL      Glucose 148 mg/dL      Calcium 10.2 mg/dL      AST 15 U/L      ALT 22 U/L      Alkaline Phosphatase 80 U/L      Total Protein 7.6 g/dL      Albumin 4.8 g/dL      Total Bilirubin 2.54 mg/dL      eGFR 86 ml/min/1.73sq m     Narrative:      National Kid[employer] guidelines for Chronic Kidney Disease (CKD):     Stage 1 with normal or high GFR (GFR > 90 mL/min/1.73 square meters)    Stage 2 Mild CKD (GFR = 60-89 mL/min/1.73 square meters)    Stage 3A Moderate CKD (GFR = 45-59 mL/min/1.73 square meters)    Stage 3B Moderate CKD (GFR = 30-44 mL/min/1.73 square meters)    Stage 4 Severe CKD (GFR = 15-29 mL/min/1.73 square meters)    Stage 5 End Stage CKD (GFR <15 mL/min/1.73 square meters)  Note: GFR calculation is accurate only with a steady state creatinine    Lipase [[id number]]  (Normal) Collected: 04/22/24 2241    Lab Status: Final result Specimen: Blood from Arm, Right Updated: 04/22/24 2303     Lipase 12 u/L     Bilirubin, direct [[id number]]  (Abnormal) Collected: 04/22/24 2241    Lab Status: Final result Specimen: Blood from Arm, Right Updated: 04/22/24 2303     Bilirubin, Direct 0.36 mg/dL     CBC and differential [[id number]]  (Abnormal) Collected: 04/22/24 2241    Lab Status: Final result Specimen: Blood from Arm, Right Updated: 04/22/24 2248     WBC 17.44 Thousand/uL      RBC 5.54 Million/uL      Hemoglobin 16.5 g/dL      Hematocrit 47.2 %      MCV 85 fL      MCH 29.8 pg      MCHC  35.0 g/dL      RDW 12.2 %      MPV 9.8 fL      Platelets 292 Thousands/uL      nRBC 0 /100 WBCs      Segmented % 79 %      Immature Grans % 1 %      Lymphocytes % 9 %      Monocytes % 10 %      Eosinophils Relative 0 %      Basophils Relative 1 %      Absolute Neutrophils 13.87 Thousands/µL      Absolute Immature Grans 0.10 Thousand/uL      Absolute Lymphocytes 1.49 Thousands/µL      Absolute Monocytes 1.82 Thousand/µL      Eosinophils Absolute 0.07 Thousand/µL      Basophils Absolute 0.09 Thousands/µL                    CT abdomen pelvis w contrast   Final Result by Elvis English MD (04/22 2349)      Cholelithiasis with mild to moderate pericholecystic inflammatory changes highly suspicious for acute cholecystitis. Correlation with laboratory studies is recommended. A right upper quadrant ultrasound could be performed for further evaluation.      Mild colonic wall thickening at the hepatic flexure likely reactive from adjacent gallbladder inflammatory changes.      Scattered colonic diverticulosis with no evidence of diverticulitis.         Workstation performed: [id number]                    Procedures  Procedures         ED Course  ED Course as of 04/23/24 0017   Tue Apr 23, 2024   0006 Discussed with on-call surgery, Dr. Gamble, does recommend admission for acute cholecystitis                               SBIRT 22yo+      Flowsheet Row Most Recent Value   Initial Alcohol Screen: US AUDIT-C     1. How often do you have a drink containing alcohol? 0 Filed at: 04/22/2024 2221   2. How many drinks containing alcohol do you have on a typical day you are drinking?  0 Filed at: 04/22/2024 2221   3a. Male UNDER 65: How often do you have five or more drinks on one occasion? 0 Filed at: 04/22/2024 2221   Audit-C Score 0 Filed at: 04/22/2024 2221   YANNI: How many times in the past year have you...    Used an illegal drug or used a prescription medication for non-medical reasons? Never Filed at: 04/22/2024 2221                       Medical Decision Making  Based on the history and medical screening exam performed the diagnostic considerations include but are not limited to gastroenteritis, gastritis, cholelithiasis, cholecystitis, pancreatitis, colitis.    Patient presents to the emergency department with abdominal pain.      Based on the work-up performed in the emergency department which includes physical examination, laboratory testing, imaging which may include advanced imaging as necessary such as CT scan or ultrasound, it is deemed that the patient will require admission to the hospital for treatment of acute cholecystitis.    Patient remains hemodynamically stable but white count is 17 and patient has findings of acute cholecystitis on CAT scan.  Discussed with on-call surgery, recommends admission for same.    Amount and/or Complexity of Data Reviewed  Labs: ordered. Decision-making details documented in ED Course.     Details: White count 17  Radiology: ordered and independent interpretation performed. Decision-making details documented in ED Course.     Details: Acute cholecystitis on CT abdomen pelvis    Risk  Prescription drug management.  Decision regarding hospitalization.             Disposition  Final diagnoses:   Cholecystitis     Time reflects when diagnosis was documented in both MDM as applicable and the Disposition within this note       Time User Action Codes Description Comment    4/23/2024 12:07 AM Pedro Austin Add [K81.9] Cholecystitis           ED Disposition       ED Disposition   Admit    Condition   Stable    Date/Time   Tue Apr 23, 2024 0007    Comment                  Follow-up Information    None         Patient's Medications   Discharge Prescriptions    No medications on file       No discharge procedures on file.    PDMP Review       None            ED Provider  Electronically Signed by             Pedro Austin MD  04/23/24 0017

## 2024-04-24 VITALS
HEIGHT: 71 IN | RESPIRATION RATE: 18 BRPM | TEMPERATURE: 98.6 F | DIASTOLIC BLOOD PRESSURE: 67 MMHG | HEART RATE: 87 BPM | OXYGEN SATURATION: 94 % | BODY MASS INDEX: 30.8 KG/M2 | WEIGHT: 220 LBS | SYSTOLIC BLOOD PRESSURE: 125 MMHG

## 2024-04-24 RX ORDER — ACETAMINOPHEN 325 MG/1
650 TABLET ORAL EVERY 6 HOURS PRN
Status: DISCONTINUED | OUTPATIENT
Start: 2024-04-24 | End: 2024-04-24 | Stop reason: HOSPADM

## 2024-04-24 RX ORDER — CIPROFLOXACIN 500 MG/1
500 TABLET, FILM COATED ORAL EVERY 12 HOURS SCHEDULED
Qty: 10 TABLET | Refills: 0 | Status: SHIPPED | OUTPATIENT
Start: 2024-04-24 | End: 2024-04-29

## 2024-04-24 RX ORDER — IBUPROFEN 600 MG/1
600 TABLET ORAL EVERY 6 HOURS PRN
Status: DISCONTINUED | OUTPATIENT
Start: 2024-04-24 | End: 2024-04-24 | Stop reason: HOSPADM

## 2024-04-24 RX ORDER — METRONIDAZOLE 500 MG/1
500 TABLET ORAL EVERY 8 HOURS SCHEDULED
Qty: 15 TABLET | Refills: 0 | Status: SHIPPED | OUTPATIENT
Start: 2024-04-24 | End: 2024-04-29

## 2024-04-24 RX ADMIN — SODIUM CHLORIDE 75 ML/HR: 0.9 INJECTION, SOLUTION INTRAVENOUS at 00:47

## 2024-04-24 RX ADMIN — METRONIDAZOLE 500 MG: 500 INJECTION, SOLUTION INTRAVENOUS at 07:30

## 2024-04-24 RX ADMIN — HEPARIN SODIUM 5000 UNITS: 5000 INJECTION, SOLUTION INTRAVENOUS; SUBCUTANEOUS at 05:24

## 2024-04-24 RX ADMIN — METRONIDAZOLE 500 MG: 500 INJECTION, SOLUTION INTRAVENOUS at 00:44

## 2024-04-24 RX ADMIN — CIPROFLOXACIN 400 MG: 400 INJECTION, SOLUTION INTRAVENOUS at 12:05

## 2024-04-24 RX ADMIN — CIPROFLOXACIN 400 MG: 400 INJECTION, SOLUTION INTRAVENOUS at 01:34

## 2024-04-24 RX ADMIN — HEPARIN SODIUM 5000 UNITS: 5000 INJECTION, SOLUTION INTRAVENOUS; SUBCUTANEOUS at 13:15

## 2024-04-24 NOTE — PROGRESS NOTES
"Progress Note - General Surgery   Nicholas Worley 63 y.o. male MRN: [id number]  Unit/Bed#: -01 Encounter: [id number]    Assessment:  62yo M POD1 s/p lap fernando and open umbilical hernia repair    Operative Findings: 1.5 cm umbilical hernia.  Upon entry into the abdomen green bilious ascites was noted.  The gallbladder was found to be gangrenous, which was the source of the bilious ascites.  Large gallstone within the fundus of the gallbladder noted    Afebrile, VS WNL    Plan:  Advance to soft surgical diet  Discharge planning for later this afternoon pending diet tolerance  Continue IV fluid hydration  Continue IV antibiotics, Cipro and Flagyl  Pain/nausea medication as needed  DVT prophylaxis, heparin    Subjective: He has been feeling well today.  He has been up walking around.  He had a small formed bowel movement this morning.  He has been passing flatus.  He has tolerated clear liquid diet without any nausea.  He has some mild pain near his incision sites.     Objective:   Physical Exam:  General: VSS, NAD, alert, pleasant  Head normocephalic, atraumatic  Neck: supple, NT, no masses or JVD   Lungs CTA in all fields  Heart RRR with no murmur  Abd soft, no distension, NABSx4, NT, no masses or guarding.  Multiple surgical incisions with surgical glue intact.  No bleeding, drainage or signs of infection.  Extremities: FAROM with good strength equal bilaterally, no edema  Neuro: A&Ox3, affect appropriate, distal sensation and muscular strength intact      Blood pressure 125/67, pulse 87, temperature 98.6 °F (37 °C), resp. rate 18, height 5' 11\" (1.803 m), weight 99.8 kg (220 lb), SpO2 94%.,Body mass index is 30.68 kg/m².      Intake/Output Summary (Last 24 hours) at 4/24/2024 1712  Last data filed at 4/24/2024 1535  Gross per 24 hour   Intake 4270 ml   Output --   Net 4270 ml       Invasive Devices       None                 Lab, Imaging and other studies:CBC: No results found for: \"WBC\", \"HGB\", \"HCT\", \"MCV\", " "\"PLT\", \"ADJUSTEDWBC\", \"RBC\", \"MCH\", \"MCHC\", \"RDW\", \"MPV\", \"NRBC\", CMP: No results found for: \"SODIUM\", \"K\", \"CL\", \"CO2\", \"ANIONGAP\", \"BUN\", \"CREATININE\", \"GLUCOSE\", \"CALCIUM\", \"AST\", \"ALT\", \"ALKPHOS\", \"PROT\", \"BILITOT\", \"EGFR\", Coagulation: No results found for: \"PT\", \"INR\", \"APTT\", Urinalysis: No results found for: \"COLORU\", \"CLARITYU\", \"SPECGRAV\", \"PHUR\", \"LEUKOCYTESUR\", \"NITRITE\", \"PROTEINUA\", \"GLUCOSEU\", \"KETONESU\", \"BILIRUBINUR\", \"BLOODU\", Amylase: No results found for: \"AMYLASE\", Lipase: No results found for: \"LIPASE\"  VTE Pharmacologic Prophylaxis: Heparin  VTE Mechanical Prophylaxis: sequential compression device      "

## 2024-04-24 NOTE — NURSING NOTE
Pt returned back to room from PACU, received report at bedside. VSS. pt complains of minimal pain, 3/10. surgical sites clean, dry, and intact. Family at bedside. No additional requests from pt at this time.

## 2024-04-24 NOTE — DISCHARGE SUMMARY
"  Discharge Summary - Nicholas Worley 63 y.o. male MRN: [id number]    Unit/Bed#: -01 Encounter: [id number]    Admission Date: 4/22/2024   Discharge Date: 04/24/24    Admitting Diagnosis:   Cholecystitis [K81.9]  Abdominal pain [R10.9]    Discharge Diagnoses: Principal Problem:    Acute cholecystitis      Consultations: ***    Imaging: ***    Procedures Performed: ***    HPI: (obtained from admission H&P completed by *** on *** )  ***    Hospital Course: Nicholas Worley is a 63 y.o. male who presented 4/22/2024 with ***. The patient was take to the operating room on ***. Intraoperative findings included: ***. The patient's hospital course was un***complicated by ***    Patient was discharged on hospital day***/POD***. On the day of discharge, the patient was voiding spontaneously, ambulating at baseline, and pain was well controlled. The patient was sent home with medication for *** (pain, stool softener, nausea). *** She/He understood all instructions for discharge. *** She/He was also given the names and numbers of the providers as well as instructions for follow up appointments.     Condition at Discharge: {condition:40119}     Discharge instructions/Information to patient and family:   See after visit summary for information provided to patient and family.      Provisions for Follow-Up Care:  See after-visit summary for information related to follow-up care and any pertinent home health orders.      Disposition: {Discharge Disposition:94122}    Planned Readmission: {EXAM; YES/NO:22367::\"No\"}    Discharge Statement   I spent 30 minutes discharging the patient. This time was spent on the day of discharge. I had direct contact with the patient on the day of discharge. Additional documentation is required if more than 30 minutes were spent on discharge.     Discharge Medications:  See after visit summary for reconciled discharge medications provided to patient and family.                " cholelithiasis in 2021 following a similar episode. At that time pt preferred observation given he had improved with dietary changes.     Reports that today he continues with right upper quadrant and epigastric discomfort.  No significant bloating.  Denies any fever, chills, diarrhea, nausea, vomiting.  He did notice some nausea after receiving pain medication.     Patient also reports having known umbilical hernia, which recently has been enlarging in size.  Denies any difficulty with reducing it, but is asking if this could be fixed at the same time as gallbladder removal.     Accompanied by his wife.     Denies any allergies to medications.     Denies any issues with anesthesia previously.     Denies any previous abdominal surgeries.     Hospital Course: Nicholas Worley is a 63 y.o. male who presented 4/22/2024 with acute calculus cholecystitis.. The patient was take to the operating room on 4/23/24 for laparoscopic cholecystectomy and umb hernia repair. Intraoperative findings included 1.5 cm umbilical hernia.  Upon entry into the abdomen green bilious ascites was noted.  The gallbladder was found to be gangrenous, which was the source of the bilious ascites.  Large gallstone within the fundus of the gallbladder noted.  On POD1,  he was doing well, his diet was advanced was advanced. He was receiving IV cipro and flagyll. Later that afternoon, he was discharged home with a prescription for 5 days of Augmentin and plans to follow-up in the surgery office in 2 weeks.    Patient was discharged on hospital day2/POD1. On the day of discharge, the patient was voiding spontaneously, ambulating at baseline, and pain was well controlled. He understood all instructions for discharge. He was also given the names and numbers of the providers as well as instructions for follow up appointments.     Condition at Discharge: good     Discharge instructions/Information to patient and family:   See after visit summary for information  provided to patient and family.      Provisions for Follow-Up Care:  See after-visit summary for information related to follow-up care and any pertinent home health orders.      Disposition: Home    Planned Readmission: No    Discharge Statement   I spent 30 minutes discharging the patient. This time was spent on the day of discharge. I had direct contact with the patient on the day of discharge. Additional documentation is required if more than 30 minutes were spent on discharge.     Discharge Medications:  See after visit summary for reconciled discharge medications provided to patient and family.

## 2024-04-24 NOTE — ANESTHESIA POSTPROCEDURE EVALUATION
Post-Op Assessment Note            No anethesia notable event occurred.    Staff: Anesthesiologist     Reason for prolonged intubation > 24 hours:  Extubated in ORReason for prolonged intubation > 48 hours:  Extubated in OR          BP      Temp      Pulse     Resp      SpO2

## 2024-04-24 NOTE — PLAN OF CARE
Problem: PAIN - ADULT  Goal: Verbalizes/displays adequate comfort level or baseline comfort level  Description: Interventions:  - Encourage patient to monitor pain and request assistance  - Assess pain using appropriate pain scale  - Administer analgesics based on type and severity of pain and evaluate response  - Implement non-pharmacological measures as appropriate and evaluate response  - Consider cultural and social influences on pain and pain management  - Notify physician/advanced practitioner if interventions unsuccessful or patient reports new pain  Outcome: Progressing     Problem: INFECTION - ADULT  Goal: Absence or prevention of progression during hospitalization  Description: INTERVENTIONS:  - Assess and monitor for signs and symptoms of infection  - Monitor lab/diagnostic results  - Monitor all insertion sites, i.e. indwelling lines, tubes, and drains  - Monitor endotracheal if appropriate and nasal secretions for changes in amount and color  - [place] appropriate cooling/warming therapies per order  - Administer medications as ordered  - Instruct and encourage patient and family to use good hand hygiene technique  - Identify and instruct in appropriate isolation precautions for identified infection/condition  Outcome: Progressing  Goal: Absence of fever/infection during neutropenic period  Description: INTERVENTIONS:  - Monitor WBC    Outcome: Progressing     Problem: SAFETY ADULT  Goal: Patient will remain free of falls  Description: INTERVENTIONS:  - Educate patient/family on patient safety including physical limitations  - Instruct patient to call for assistance with activity   - Consult OT/PT to assist with strengthening/mobility   - Keep Call bell within reach  - Keep bed low and locked with side rails adjusted as appropriate  - Keep care items and personal belongings within reach  - Initiate and maintain comfort rounds  - Make Fall Risk Sign visible to staff  - Offer Toileting every 2 Hours,  in advance of need  - Initiate/Maintain alarm  - Obtain necessary fall risk management equipment:   - Apply yellow socks and bracelet for high fall risk patients  - Consider moving patient to room near nurses station  Outcome: Progressing  Goal: Maintain or return to baseline ADL function  Description: INTERVENTIONS:  -  Assess patient's ability to carry out ADLs; assess patient's baseline for ADL function and identify physical deficits which impact ability to perform ADLs (bathing, care of mouth/teeth, toileting, grooming, dressing, etc.)  - Assess/evaluate cause of self-care deficits   - Assess range of motion  - Assess patient's mobility; develop plan if impaired  - Assess patient's need for assistive devices and provide as appropriate  - Encourage maximum independence but intervene and supervise when necessary  - Involve family in performance of ADLs  - Assess for home care needs following discharge   - Consider OT consult to assist with ADL evaluation and planning for discharge  - Provide patient education as appropriate  Outcome: Progressing  Goal: Maintains/Returns to pre admission functional level  Description: INTERVENTIONS:  - Perform AM-PAC 6 Click Basic Mobility/ Daily Activity assessment daily.  - Set and communicate daily mobility goal to care team and patient/family/caregiver.   - Collaborate with rehabilitation services on mobility goals if consulted  - Perform Range of Motion 3 times a day.  - Reposition patient every 2 hours.  - Dangle patient 3 times a day  - Stand patient 3 times a day  - Ambulate patient 3 times a day  - Out of bed to chair 3 times a day   - Out of bed for meals 3 times a day  - Out of bed for toileting  - Record patient progress and toleration of activity level   Outcome: Progressing     Problem: DISCHARGE PLANNING  Goal: Discharge to home or other facility with appropriate resources  Description: INTERVENTIONS:  - Identify barriers to discharge w/patient and caregiver  -  Arrange for needed discharge resources and transportation as appropriate  - Identify discharge learning needs (meds, wound care, etc.)  - Arrange for interpretive services to assist at discharge as needed  - Refer to Case Management Department for coordinating discharge planning if the patient needs post-hospital services based on physician/advanced practitioner order or complex needs related to functional status, cognitive ability, or social support system  Outcome: Progressing     Problem: Knowledge Deficit  Goal: Patient/family/caregiver demonstrates understanding of disease process, treatment plan, medications, and discharge instructions  Description: Complete learning assessment and assess knowledge base.  Interventions:  - Provide teaching at level of understanding  - Provide teaching via preferred learning methods  Outcome: Progressing

## 2024-04-24 NOTE — NURSING NOTE
Patient has all personal belongings. PIV removed prior to discharge. Patient and spouse verbalized understanding of discharge instructions. Patient declined escort from staff for discharge to front of building.

## 2024-04-24 NOTE — ANESTHESIA POSTPROCEDURE EVALUATION
Post-Op Assessment Note    CV Status:  Stable  Pain Score: 0    Pain management: adequate       Mental Status:  Alert and awake   Hydration Status:  Stable   PONV Controlled:  Controlled   Airway Patency:  Patent     Post Op Vitals Reviewed: Yes    No anethesia notable event occurred.    Staff: CRNA               BP   106/59   Temp   98.1   Pulse  84   Resp   22   SpO2   95

## 2024-04-24 NOTE — OP NOTE
OPERATIVE REPORT  PATIENT NAME: Nicholas Worley    :  1960  MRN: [id number]  Pt Location: OW OR ROOM 01    SURGERY DATE: 2024    Surgeons and Role:     * Estefani Green DO - Primary     * Katrina Elizabeth Billig, PA-C - Assisting    Preop Diagnosis:  Cholecystitis [K81.9]  Umbilical hernia without obstruction and without gangrene [K42.9]    Post-Op Diagnosis Codes:     * Cholecystitis [K81.9]     * Umbilical hernia without obstruction and without gangrene [K42.9]    Procedure(s):  CHOLECYSTECTOMY LAPAROSCOPIC  REPAIR HERNIA UMBILICAL    Specimen(s):  ID Type Source Tests Collected by Time Destination   1 : gallbladder Tissue Gallbladder TISSUE EXAM Estefani Green DO 2024  6:51 PM    2 : hernia sac Tissue Soft Tissue, Other TISSUE EXAM Estefani Green DO 2024  8:07 PM        Estimated Blood Loss:   Minimal    Drains:  * No LDAs found *    Anesthesia Type:   General    Operative Indications:  Cholecystitis [K81.9]  Umbilical hernia without obstruction and without gangrene [K42.9]      Operative Findings:  1.5 cm umbilical hernia.  Upon entry into the abdomen green bilious ascites was noted.  The gallbladder was found to be gangrenous, which was the source of the bilious ascites.  Large gallstone within the fundus of the gallbladder noted    Complications:   None    Procedure and Technique:  The patient is brought to the operating room.  Timeout was held the patient identified and procedure verified.  Appropriate antibiotic prophylaxis and DVT prophylaxis was used.  General anesthesia was administered.  Care of the abdomen is prepped and draped in usual sterile fashion using chlorhexidine solution.  Local anesthesia using 0.25% Marcaine with epinephrine was infiltrated in the periumbilical area.  A curvilinear incision was made in the infraumbilical area using a 15 blade scalpel.  Dissection was carried down through subcutaneous tissue.  The hernia sac was  encountered within the subcutaneous tissue.  The umbilical stalk was released from the hernia sac using sharp dissection.  The remaining subcutaneous tissue was  from the hernia sac.  The hernia sac was entered.  No contents or adhesions were noted.  A 5 mm trocar was placed to the hernia sac.  The abdomen was insufflated to 15 mmHg intraperitoneal pressure.  Immediately upon entering the abdomen, bilious green ascites was noted.  An additional 11 mm trocar was placed in the epigastric area after the infiltration of local anesthesia and under direct visualization.  Two 5 mm trocars were placed in the right upper quadrant in the same manner.  The patient was then placed in reverse Trendelenburg position and rotated towards the left.  The omentum was retracted out of the right upper quadrant.  This revealed a gangrenous gallbladder.  There were adhesions of the right colon to the gallbladder, these were taken down using blunt dissection.  The ascites was suctioned and drained.  The fundus of the gallbladder was tensely distended.  In order to facilitate dissection, the gallbladder was decompressed using a suction needle.  The fundus was able to be grasped and elevated anteriorly and superiorly.  This was difficult due to a large gallstone within the fundus as well as the thick-walled nature of the fundus.  Nguyen's pouch area of the gallbladder was able to be identified.  The area was able to be grasped.  Blunt and sharp dissection was used around the area of the fundus to identify the cystic duct.  The cystic duct was able to be isolated.  Once the cystic duct was isolated, the duct was clipped twice proximally and once distally and divided between clips.  Following this, it was difficult to grasp the gallbladder to continue dissection.  It was decided to proceed in a dome down fashion.  The harmonic was used to begin dissection of the gallbladder from the fundus down.  Again, the large stone within the  fundus made this difficult.  At this point, decided to open the gallbladder and extract the large stone to facilitate dissection.  A large 3 to 4 cm stone was evacuated from the gallbladder.  The gallbladder was then able to be better retracted.  A combination of the harmonic and electrocautery were then used to free the gallbladder from the hepatic fossa.  Once the gallbladder was completely removed the gallbladder as well as the large stones were placed within an Endo Catch bag.  The right upper quadrant was irrigated and suctioned until clean.  Hemostasis was ensured using electrocautery.  The gallbladder and stones were then removed within the Endo Catch bag through the 11 mm trocar site.  In order to do this, the trocar site fascia and skin incisions had to be extended.  Following extraction of the gallbladder, the right upper quadrant was once again irrigated and suctioned until clean.  Hemostasis was achieved.  All trocars were removed and the abdomen was desufflated.  Attention was then turned towards the umbilical hernia.  The hernia sac was excised at the level of the fascia using electrocautery.  Figure-of-eight sutures of 0 PDS were then used to close the hernia defect.  The umbilical stalk was reapproximated with the fascia using a suture of 3-0 Vicryl.  Subcutaneous tissue was closed using 3-0 Vicryl in the umbilical and epigastric incisions.  Skin was closing 4-0 Vicryl in the subcuticular layer.  All incisions were covered with skin glue.  The patient tolerated procedure well was transferred to recovery in stable condition.  At the end the procedure all needle instrument sponge counts were correct x 2.   I was present for the entire procedure. and A physician assistant was required during the procedure for retraction, tissue handling, dissection and suturing.    Patient Disposition:  PACU         SIGNATURE: Estefani Green DO  DATE: April 23, 2024  TIME: 8:35 PM

## 2024-04-24 NOTE — UTILIZATION REVIEW
Initial Clinical Review    Admission: Date/Time/Statement:   Admission Orders (From admission, onward)       Ordered        04/23/24 0007  INPATIENT ADMISSION  Once                          Orders Placed This Encounter   Procedures    INPATIENT ADMISSION     Standing Status:   Standing     Number of Occurrences:   1     Order Specific Question:   Level of Care     Answer:   Med Surg [16]     Order Specific Question:   Estimated length of stay     Answer:   More than 2 Midnights     Order Specific Question:   Certification     Answer:   I certify that inpatient services are medically necessary for this patient for a duration of greater than two midnights. See H&P and MD Progress Notes for additional information about the patient's course of treatment.     ED Arrival Information       Expected   -    Arrival   4/22/2024 22:17    Acuity   Urgent              Means of arrival   Walk-In    Escorted by   Spouse    Service   Surgery-General    Admission type   Emergency              Arrival complaint   abd pain             Chief Complaint   Patient presents with    Abdominal Pain     Pt reports mid/upper abdominal pain that started Saturday while he was in [place]. Denies N/V/D, denies fevers.        Initial Presentation: 63 y.o. male to ED via walk-in from home  Present to ED with complaints of right upper quadrant pain that began on Saturday when he was in [place], worse with eating.  Pain continued to worsen, with some epigastric pain noted as well.  Patient was able to fly home on Sunday.  He reports that he has had continued worsening of his right upper quadrant pain, which prompted him to come to the emergency room for further evaluation.  Patient reports that yesterday after eating pizza in the afternoon, he noted pain was severe, and has not improved even with pain medication since admission.   PMHX: None  Admitted to [facility] with DX: Acute cholecystitis   on exam: abdominal distension with RUQ tenderness; Pain 7/10;  hypoactive bowel sounds; Noted umbilical hernia with mild skin thinning.  Able to be easily reduced, feels as though it contains bowel, with mild tenderness during reduction.  WBC 15.25  CT concerning for acute cholecystitis along with small wide necked umbilical hernia containing of small partial loop of small bowel that evidence of strangulation obstruction.   PLAN: OR today; NPO; pain / nausea control (see below); start iv abx; cont ivf; monitor labs      OP NOTE  SURGERY DATE: 4/23/2024   Procedure(s): CHOLECYSTECTOMY LAPAROSCOPIC / REPAIR HERNIA UMBILICAL  Anesthesia Type: General  Operative Findings: 1.5 cm umbilical hernia.  Upon entry into the abdomen green bilious ascites was noted.  The gallbladder was found to be gangrenous, which was the source of the bilious ascites.  Large gallstone within the fundus of the gallbladder noted    Date: 4/24/24      Day 2  Plan: cont iv abx; cont ivf; monitor labs; pain / nausea control (see below)      ED Triage Vitals [04/22/24 2219]   Temperature Pulse Respirations Blood Pressure SpO2   97.5 °F (36.4 °C) 91 18 145/91 95 %      Temp Source Heart Rate Source Patient Position - Orthostatic VS BP Location FiO2 (%)   Temporal Monitor Sitting Right arm --      Pain Score       7          Wt Readings from Last 1 Encounters:   04/23/24 99.8 kg (220 lb)     Additional Vital Signs:   Date/Time Temp Pulse Resp BP MAP (mmHg) SpO2 O2 Flow Rate (L/min) O2 Device Cardiac (WDL) Patient Position - Orthostatic VS   04/24/24 12:33:57 97.9 °F (36.6 °C) 84 18 96/74 81 95 % -- -- -- --   04/24/24 1033 -- -- -- -- -- 94 % -- None (Room air) -- --   04/24/24 0722 -- -- -- -- -- 94 % -- None (Room air) -- --   04/24/24 07:21:41 97.9 °F (36.6 °C) 70 18 99/63 75 94 % -- None (Room air) -- Lying   04/24/24 03:11:24 -- 67 18 105/54 71 92 % -- -- -- --   04/24/24 01:33:03 -- 64 -- -- -- 94 % -- -- -- --   04/23/24 23:11:23 97.5 °F (36.4 °C) 77 18 113/68 83 93 % -- -- -- --   04/23/24 21:55:57 97.7  °F (36.5 °C) 78 -- 110/78 89 91 % -- -- -- --   04/23/24 2153 -- -- -- -- -- -- -- None (Room air) -- --   04/23/24 2129 98.4 °F (36.9 °C) 86 18 108/59 79 93 % -- None (Room air) WDL --   04/23/24 2114 98.4 °F (36.9 °C) 88 20 101/57 73 97 % 6 L/min Simple mask WDL --   04/23/24 2059 97.4 °F (36.3 °C) Abnormal  86 18 106/58 76 97 % 6 L/min Simple mask WDL --   04/23/24 2044 98 °F (36.7 °C) 85 18 106/59 76 99 % 6 L/min Simple mask WDL --   04/23/24 15:24:48 99.3 °F (37.4 °C) 87 16 117/75 89 95 % -- -- -- --   04/23/24 1100 -- -- -- -- -- 95 % -- None (Room air) -- --   04/23/24 0814 -- -- -- -- -- 94 % -- None (Room air) -- --   04/23/24 0744 -- -- -- -- -- 94 % -- None (Room air) -- --   04/23/24 0734 -- -- -- -- -- 96 % -- None (Room air) -- --   04/23/24 07:28:24 98.8 °F (37.1 °C) 83 17 125/73 90 91 % -- -- -- --   04/23/24 0047 98.4 °F (36.9 °C) 80 17 143/83 103 95 % -- None (Room air) -- Sitting   04/23/24 00:33:28 -- 73 17 143/83 103 100 % -- -- -- --   04/22/24 2219 97.5 °F (36.4 °C) 91 18 145/91 113 95 % -- None (Room air) -- Sitting       EKG: None obtained      Pertinent Labs/Diagnostic Test Results:   CT abdomen pelvis w contrast   Final Result by Elvis English MD (04/22 2349)      Cholelithiasis with mild to moderate pericholecystic inflammatory changes highly suspicious for acute cholecystitis. Correlation with laboratory studies is recommended. A right upper quadrant ultrasound could be performed for further evaluation.      Mild colonic wall thickening at the hepatic flexure likely reactive from adjacent gallbladder inflammatory changes.      Scattered colonic diverticulosis with no evidence of diverticulitis.         Workstation performed: [id number]              Results from last 7 days   Lab Units 04/23/24  0549 04/23/24  0051 04/22/24  2241   WBC Thousand/uL 15.25*  --  17.44*   HEMOGLOBIN g/dL 14.8  --  16.5   HEMATOCRIT % 42.7  --  47.2   PLATELETS Thousands/uL 236 246 292   TOTAL NEUT ABS  Thousands/µL 12.03*  --  13.87*         Results from last 7 days   Lab Units 04/22/24  2241   SODIUM mmol/L 135   POTASSIUM mmol/L 3.9   CHLORIDE mmol/L 97   CO2 mmol/L 28   ANION GAP mmol/L 10   BUN mg/dL 13   CREATININE mg/dL 0.95   EGFR ml/min/1.73sq m 86   CALCIUM mg/dL 10.2     Results from last 7 days   Lab Units 04/22/24  2241   AST U/L 15   ALT U/L 22   ALK PHOS U/L 80   TOTAL PROTEIN g/dL 7.6   ALBUMIN g/dL 4.8   TOTAL BILIRUBIN mg/dL 2.54*   BILIRUBIN DIRECT mg/dL 0.36*        Results from last 7 days   Lab Units 04/22/24  2241   GLUCOSE RANDOM mg/dL 148*        Results from last 7 days   Lab Units 04/22/24  2241   LIPASE u/L 12          ED Treatment:   Medication Administration from 04/22/2024 2215 to 04/23/2024 0028         Date/Time Order Dose Route Action     04/22/2024 2241 EDT sodium chloride 0.9 % bolus 1,000 mL 1,000 mL Intravenous New Bag     04/22/2024 2241 EDT ketorolac (TORADOL) injection 30 mg 30 mg Intravenous Given     04/22/2024 2333 EDT iohexol (OMNIPAQUE) 350 MG/ML injection (MULTI-DOSE) 100 mL 100 mL Intravenous Given          Present on Admission:   Acute cholecystitis      Admitting Diagnosis: Cholecystitis [K81.9]  Abdominal pain [R10.9]    Age/Sex: 63 y.o. male    Admission Orders: SCDs; I/O; NPO    Scheduled Medications:  ciprofloxacin, 400 mg, Intravenous, Q12H  heparin (porcine), 5,000 Units, Subcutaneous, Q8H GREG  metroNIDAZOLE, 500 mg, Intravenous, Q8H      Continuous IV Infusions:  sodium chloride, 75 mL/hr, Intravenous, Continuous      PRN Meds:  acetaminophen, 650 mg, Oral, Q6H PRN  fentaNYL, 25 mcg, Intravenous, Q5 Min PRN  HYDROmorphone, 0.5 mg, Intravenous, Q3H PRN  (4/23 recd x2)   HYDROmorphone, 0.5 mg, Intravenous, Q10 Min PRN  ibuprofen, 600 mg, Oral, Q6H PRN  ondansetron, 4 mg, Intravenous, Q6H PRN  (4/23 recd x1)   promethazine, 25 mg, Intravenous, Once PRN          Network Utilization Review Department  ATTENTION: Please call with any questions or concerns to  [phone] and carefully listen to the prompts so that you are directed to the right person. All voicemails are confidential.   For Discharge needs, contact Care Management DC Support Team at [phone] opt. 2  Send all requests for admission clinical reviews, approved or denied determinations and any other requests to dedicated fax number below belonging to the campus where the patient is receiving treatment. List of dedicated fax numbers for the Facilities:  FACILITY NAME UR FAX NUMBER   ADMISSION DENIALS (Administrative/Medical Necessity) [phone]   DISCHARGE SUPPORT TEAM (NETWORK) [phone]   PARENT CHILD HEALTH (Maternity/NICU/Pediatrics) [phone]   [facility] [phone]   [facility] [phone]   [facility] [phone]   [facility] [phone]   [facility] [phone]   [facility] [phone]   [facility] [phone]   [facility] [phone]   [facility] [phone]   [facility] [phone]   [facility] [phone]   [facility] [phone]

## 2024-04-24 NOTE — PLAN OF CARE

## 2024-04-25 NOTE — UTILIZATION REVIEW
NOTIFICATION OF ADMISSION DISCHARGE   This is a Notification of Discharge from [facility]. Please be advised that this patient has been discharge from our facility. Below you will find the admission and discharge date and time including the patient’s disposition.   UTILIZATION REVIEW CONTACT:  Lilo Mendez  Utilization   Network Utilization Review Department  Phone: [phone] x carefully listen to the prompts. All voicemails are confidential.  Email: [email or web address]     ADMISSION INFORMATION  PRESENTATION DATE: 4/22/2024 10:25 PM  OBERVATION ADMISSION DATE:   INPATIENT ADMISSION DATE: 4/23/24 12:07 AM   DISCHARGE DATE: 4/24/2024  4:28 PM   DISPOSITION:Home/Self Care    Network Utilization Review Department  ATTENTION: Please call with any questions or concerns to [phone] and carefully listen to the prompts so that you are directed to the right person. All voicemails are confidential.   For Discharge needs, contact Care Management DC Support Team at [phone] opt. 2  Send all requests for admission clinical reviews, approved or denied determinations and any other requests to dedicated fax number below belonging to the campus where the patient is receiving treatment. List of dedicated fax numbers for the Facilities:  FACILITY NAME UR FAX NUMBER   ADMISSION DENIALS (Administrative/Medical Necessity) [phone]   DISCHARGE SUPPORT TEAM ([facility]) [phone]   PARENT CHILD HEALTH (Maternity/NICU/Pediatrics) [phone]   [facility] [phone]   [facility] [phone]   [facility] [phone]   [facility] [phone]   [facility] [phone]   [facility] [phone]   [facility] [phone]   [facility]  [phone]   [facility] [phone]   [facility] [phone]   [facility] [phone]   [facility] [phone]

## 2024-04-30 PROCEDURE — 88304 TISSUE EXAM BY PATHOLOGIST: CPT | Performed by: STUDENT IN AN ORGANIZED HEALTH CARE EDUCATION/TRAINING PROGRAM

## 2024-04-30 PROCEDURE — 88302 TISSUE EXAM BY PATHOLOGIST: CPT | Performed by: STUDENT IN AN ORGANIZED HEALTH CARE EDUCATION/TRAINING PROGRAM

## (undated) DEVICE — NEEDLE 25G X 1 1/2

## (undated) DEVICE — VISUALIZATION SYSTEM: Brand: CLEARIFY

## (undated) DEVICE — DRAPE EQUIPMENT RF WAND

## (undated) DEVICE — ALLENTOWN LAP CHOLE APP PACK: Brand: CARDINAL HEALTH

## (undated) DEVICE — ENDOPOUCH RETRIEVER SPECIMEN RETRIEVAL BAGS: Brand: ENDOPOUCH RETRIEVER

## (undated) DEVICE — SURGICAL CLIPPER BLADE GENERAL USE

## (undated) DEVICE — TUBING SMOKE EVAC W/FILTRATION DEVICE PLUMEPORT ACTIV

## (undated) DEVICE — DECANTER: Brand: UNBRANDED

## (undated) DEVICE — HARMONIC ACE 5MM DIAMETER SHEARS 36CM SHAFT LENGTH + ADAPTIVE TISSUE TECHNOLOGY FOR USE WITH GENERATOR G11: Brand: HARMONIC ACE

## (undated) DEVICE — TUBING INSUFFLATION SET ISO CONNECTOR

## (undated) DEVICE — INTENDED FOR TISSUE SEPARATION, AND OTHER PROCEDURES THAT REQUIRE A SHARP SURGICAL BLADE TO PUNCTURE OR CUT.: Brand: BARD-PARKER SAFETY BLADES SIZE 11, STERILE

## (undated) DEVICE — ADHESIVE SKIN HIGH VISCOSITY EXOFIN 1ML

## (undated) DEVICE — ELECTRODE LAP J HOOK E-Z CLEAN 33CM-0021

## (undated) DEVICE — SCD SEQUENTIAL COMPRESSION COMFORT SLEEVE MEDIUM KNEE LENGTH: Brand: KENDALL SCD

## (undated) DEVICE — TOWEL SURG XR DETECT GREEN STRL RFD

## (undated) DEVICE — SUT VICRYL 4-0 PS-2 27 IN J426H

## (undated) DEVICE — ENDOPATH PNEUMONEEDLE INSUFFLATION NEEDLES WITH LUER LOCK CONNECTORS 120MM: Brand: ENDOPATH

## (undated) DEVICE — SINGLE PORT MANIFOLD: Brand: NEPTUNE 2

## (undated) DEVICE — GLOVE SRG BIOGEL 6

## (undated) DEVICE — TROCAR: Brand: KII FIOS FIRST ENTRY

## (undated) DEVICE — SUT VICRYL 3-0 SH 27 IN J416H

## (undated) DEVICE — TROCAR: Brand: KII SLEEVE

## (undated) DEVICE — PENCIL ELECTROSURG E-Z CLEAN -0035H

## (undated) DEVICE — SUT PDS II 0 CT-1 36 IN Z346H

## (undated) DEVICE — GLOVE INDICATOR PI UNDERGLOVE SZ 6.5 BLUE

## (undated) DEVICE — LIGACLIP 10-M/L, 10MM ENDOSCOPIC ROTATING MULTIPLE CLIP APPLIERS: Brand: LIGACLIP

## (undated) DEVICE — INTENDED FOR TISSUE SEPARATION, AND OTHER PROCEDURES THAT REQUIRE A SHARP SURGICAL BLADE TO PUNCTURE OR CUT.: Brand: BARD-PARKER SAFETY BLADES SIZE 15, STERILE

## (undated) DEVICE — PAD GROUNDING DUAL ADULT

## (undated) DEVICE — CHLORAPREP HI-LITE 26ML ORANGE